# Patient Record
Sex: FEMALE | Race: WHITE | Employment: OTHER | URBAN - METROPOLITAN AREA
[De-identification: names, ages, dates, MRNs, and addresses within clinical notes are randomized per-mention and may not be internally consistent; named-entity substitution may affect disease eponyms.]

---

## 2017-02-03 ENCOUNTER — OFFICE VISIT (OUTPATIENT)
Dept: PAIN MANAGEMENT | Age: 52
End: 2017-02-03

## 2017-02-03 VITALS
DIASTOLIC BLOOD PRESSURE: 99 MMHG | SYSTOLIC BLOOD PRESSURE: 158 MMHG | WEIGHT: 293 LBS | BODY MASS INDEX: 49.49 KG/M2 | HEART RATE: 68 BPM

## 2017-02-03 DIAGNOSIS — M54.50 CHRONIC BILATERAL LOW BACK PAIN WITHOUT SCIATICA: ICD-10-CM

## 2017-02-03 DIAGNOSIS — G89.29 CHRONIC BILATERAL LOW BACK PAIN WITHOUT SCIATICA: ICD-10-CM

## 2017-02-03 DIAGNOSIS — M54.16 LUMBAR NEURITIS: ICD-10-CM

## 2017-02-03 DIAGNOSIS — G43.019 INTRACTABLE MIGRAINE WITHOUT AURA AND WITHOUT STATUS MIGRAINOSUS: Primary | ICD-10-CM

## 2017-02-03 DIAGNOSIS — M79.7 FIBROMYALGIA SYNDROME: ICD-10-CM

## 2017-02-03 DIAGNOSIS — S83.91XA SPRAIN OF RIGHT KNEE, UNSPECIFIED LIGAMENT, INITIAL ENCOUNTER: ICD-10-CM

## 2017-02-03 DIAGNOSIS — M51.36 DDD (DEGENERATIVE DISC DISEASE), LUMBAR: ICD-10-CM

## 2017-02-03 DIAGNOSIS — R10.2 CHRONIC PELVIC PAIN IN FEMALE: ICD-10-CM

## 2017-02-03 DIAGNOSIS — R29.898 MUSCULAR DECONDITIONING: ICD-10-CM

## 2017-02-03 DIAGNOSIS — M25.561 CHRONIC PAIN OF RIGHT KNEE: ICD-10-CM

## 2017-02-03 DIAGNOSIS — R29.6 FALLS FREQUENTLY: ICD-10-CM

## 2017-02-03 DIAGNOSIS — E66.01 MORBID OBESITY DUE TO EXCESS CALORIES (HCC): ICD-10-CM

## 2017-02-03 DIAGNOSIS — G89.29 CHRONIC PAIN OF RIGHT KNEE: ICD-10-CM

## 2017-02-03 DIAGNOSIS — M54.40 LUMBAGO OF LUMBOSACARAL REGION WITH SCIATICA: ICD-10-CM

## 2017-02-03 DIAGNOSIS — G89.29 CHRONIC PELVIC PAIN IN FEMALE: ICD-10-CM

## 2017-02-03 RX ORDER — BUPRENORPHINE HCL 300 MCG
1 FILM, MEDICATED (EA) BUCCAL EVERY 12 HOURS
Qty: 180 FILM | Refills: 0 | Status: SHIPPED | OUTPATIENT
Start: 2017-02-27 | End: 2017-05-01 | Stop reason: SDUPTHER

## 2017-02-03 RX ORDER — HYDROCODONE BITARTRATE AND ACETAMINOPHEN 10; 325 MG/1; MG/1
1 TABLET ORAL
Qty: 60 TAB | Refills: 0 | Status: SHIPPED | OUTPATIENT
Start: 2017-02-03 | End: 2017-02-03 | Stop reason: SDUPTHER

## 2017-02-03 RX ORDER — HYDROCODONE BITARTRATE AND ACETAMINOPHEN 10; 325 MG/1; MG/1
1 TABLET ORAL
Qty: 60 TAB | Refills: 0 | Status: SHIPPED | OUTPATIENT
Start: 2017-03-04 | End: 2017-05-01 | Stop reason: SDUPTHER

## 2017-02-03 NOTE — PROGRESS NOTES
Nursing Notes    Patient presents to the office today in follow-up. Patient rates her pain at 2/10 on the numerical pain scale. Reviewed medications with counts as follows:    Rx Date filled Qty Dispensed Pill Count Last Dose Short     norco 10/325mg  11/22/16 60 3 Today  No    belbuca 300mcg  12/02/16 180 73 Today  No                                     Comments:     POC UDS was not performed in office today    Any new labs or imaging since last appointment? NO    Have you been to an emergency room (ER) or urgent care clinic since your last visit? NO            Have you been hospitalized since your last visit? NO     If yes, where, when, and reason for visit? Have you seen or consulted any other health care providers outside of the [address] [place] [address]  since your last visit? NO     If yes, where, when, and reason for visit? HM deferred to pcp.

## 2017-02-03 NOTE — PATIENT INSTRUCTIONS
Plan:  Continue same medications as prescribed for chronic pain  Contact the office if knee pain/laxity symptoms worsen or do not improve. We will refer for aqua therapy for strengthening and knee pain.  Balance testing may be arranged if falls are still frequent  Follow up in 3 months or sooner if needed  Regular exercise and attention to emotional health and diet remain the most effective ways to treat chronic pain of all kinds  You may contact me with questions or concerns through [address]

## 2017-02-03 NOTE — MR AVS SNAPSHOT
Visit Information Date & Time Provider Department Dept. Phone Encounter #  
 2/3/2017  2:00 PM Lani Curry [address] [facility] [phone] Follow-up Instructions Return in about 3 months (around 5/3/2017). Follow-up and Disposition History Upcoming Health Maintenance Date Due Hepatitis C Screening 1965 DTaP/Tdap/Td series (1 - Tdap) 8/20/1984 PAP AKA CERVICAL CYTOLOGY 8/20/1984 BREAST CANCER SCRN MAMMOGRAM 8/20/2013 FOBT Q 1 YEAR AGE 50-75 8/20/2013 INFLUENZA AGE 9 TO ADULT 8/1/2016 Allergies as of 2/3/2017  Review Complete On: 2/3/2017 By: Georgiana Babinski, LPN Severity Noted Reaction Type Reactions Iodinated Contrast Media - Oral And Iv Dye High 06/25/2013   Systemic Anaphylaxis Iodine    Anaphylaxis Morphine    Nausea and Vomiting Nsaids (Non-steroidal Anti-inflammatory Drug)    Other (comments) Severe edema Current Immunizations  Never Reviewed No immunizations on file. Not reviewed this visit You Were Diagnosed With   
  
 Codes Comments Intractable migraine without aura and without status migrainosus    -  Primary ICD-10-CM: G43.019 
ICD-9-CM: 346.11 Lumbar neuritis     ICD-10-CM: M54.16 
ICD-9-CM: 724.4 Fibromyalgia syndrome     ICD-10-CM: M79.7 ICD-9-CM: 729.1 Lumbago of lumbosacaral region with sciatica     ICD-10-CM: M54.40 ICD-9-CM: 724.2, 724.3 DDD (degenerative disc disease), lumbar     ICD-10-CM: M51.36 
ICD-9-CM: 722.52 Chronic bilateral low back pain without sciatica     ICD-10-CM: M54.5, G89.29 ICD-9-CM: 724.2, 338.29 Morbid obesity due to excess calories (HCC)     ICD-10-CM: E66.01 
ICD-9-CM: 278.01 Chronic pelvic pain in female     ICD-10-CM: R10.2, G89.29 ICD-9-CM: 625.9, 338.29 Muscular deconditioning     ICD-10-CM: R29.898 ICD-9-CM: 781.99 Falls frequently     ICD-10-CM: R29.6 ICD-9-CM: V15.88   
 Chronic pain of right knee     ICD-10-CM: M25.561, G89.29 ICD-9-CM: 719.46, 338.29 Sprain of right knee, unspecified ligament, initial encounter     ICD-10-CM: S83. 91XA ICD-9-CM: 828. 9 Vitals BP Pulse Weight(growth percentile) BMI OB Status Smoking Status (!) 158/99 (BP 1 Location: Left arm, BP Patient Position: Sitting) 68 316 lb (143.3 kg) 49.49 kg/m2 Hysterectomy Never Smoker Vitals History BMI and BSA Data Body Mass Index Body Surface Area  
 49.49 kg/m 2 2.6 m 2 Preferred Pharmacy Pharmacy Name Phone [facility] [facility] [phone] Your Updated Medication List  
  
   
This list is accurate as of: 2/3/17  3:40 PM.  Always use your most recent med list.  
  
  
  
  
 albuterol 90 mcg/actuation inhaler Commonly known as:  PROVENTIL HFA, VENTOLIN HFA, PROAIR HFA Take 1 Puff by inhalation. buprenorphine HCl 300 mcg Film Commonly known as:  BELBUCA  
1 Film by Buccal route every twelve (12) hours for 90 days. Max Daily Amount: 2 Film. for chronic, severe, refractory pain. 90 day bulk for mail order Start taking on:  2/27/2017  
  
 calcium-cholecalciferol (D3) tablet Commonly known as:  CALTRATE 600+D Take 1 Tab by mouth daily. CINNAMON PO Take  by mouth. * CYMBALTA 30 mg capsule Generic drug:  DULoxetine Take 30 mg by mouth three (3) times daily. * CYMBALTA 60 mg capsule Generic drug:  DULoxetine Take 60 mg by mouth daily. diphenhydrAMINE 50 mg tablet Commonly known as:  BENADRYL Take 50 mg by mouth nightly as needed. Flaxseed Oil Oil  
by Does Not Apply route.  
  
 garlic 1 mg Cap Take  by mouth. HYDROcodone-acetaminophen  mg tablet Commonly known as:  Karoline Petri Take 1 Tab by mouth two (2) times daily as needed for Pain. Max Daily Amount: 2 Tabs. Start taking on:  3/4/2017 INDERAL LA 60 mg SR capsule Generic drug:  propranolol LA Take  by mouth daily. levothyroxine 25 mcg tablet Commonly known as:  SYNTHROID Take 0.045 mcg by mouth Daily (before breakfast). lidocaine hcl (pf) 200 mg/mL (20 %) Soln  
by IntraVENous route. LIPITOR PO Take  by mouth.  
  
 metFORMIN 500 mg tablet Commonly known as:  GLUCOPHAGE Take  by mouth two (2) times daily (with meals). omeprazole 20 mg capsule Commonly known as:  PRILOSEC Take 20 mg by mouth daily. promethazine 25 mg tablet Commonly known as:  PHENERGAN Take 25 mg by mouth every six (6) hours as needed. RELPAX 40 mg tablet Generic drug:  eletriptan Take 40 mg by mouth once as needed. may repeat in 2 hours if necessary VITAMIN D2 50,000 unit capsule Generic drug:  ergocalciferol Take 50,000 Units by mouth.  
  
 vitamin E 400 unit capsule Commonly known as:  [address] Take  by mouth daily. * Notice: This list has 2 medication(s) that are the same as other medications prescribed for you. Read the directions carefully, and ask your doctor or other care provider to review them with you. Prescriptions Printed Refills  
 buprenorphine HCl (BELBUCA) 300 mcg film 0 Starting on: 2017 Si Film by Buccal route every twelve (12) hours for 90 days. Max Daily Amount: 2 Film. for chronic, severe, refractory pain. 90 day bulk for mail order Class: Print Route: Buccal  
 HYDROcodone-acetaminophen (NORCO)  mg tablet 0 Starting on: 3/4/2017 Sig: Take 1 Tab by mouth two (2) times daily as needed for Pain. Max Daily Amount: 2 Tabs. Class: Print Route: Oral  
  
We Performed the Following REFERRAL TO PHYSICAL THERAPY [XMJ97 Custom] Comments:  
 Please evaluate patient for strengthening and rehabilitative therapy due to severe deconditioning, frequent falls, and right knee pain.  She will require Aqua Therapy, but may transition to land based therapy if tolerated. Follow-up Instructions Return in about 3 months (around 5/3/2017). Referral Information Referral ID Referred By Referred To  
  
 [id number] Vee Rabia Not Available Visits Status Start Date End Date 18 New Request 2/3/17 2/3/18 If your referral has a status of pending review or denied, additional information will be sent to support the outcome of this decision. Patient Instructions Plan: 
Continue same medications as prescribed for chronic pain 
Contact the office if knee pain/laxity symptoms worsen or do not improve. We will refer for aqua therapy for strengthening and knee pain. Balance testing may be arranged if falls are still frequent Follow up in 3 months or sooner if needed Regular exercise and attention to emotional health and diet remain the most effective ways to treat chronic pain of all kinds You may contact me with questions or concerns through [address] Patient Instructions History Introducing [facility] & HEALTH SERVICES! [employer] introduces Cels[employer] patient portal. Now you can access parts of your medical record, email your doctor's office, and request medication refills online. 1. In your internet browser, go to [email or web address] 2. Click on the First Time User? Click Here link in the Sign In box. You will see the New Member Sign Up page. 3. Enter your Celsense Access Code exactly as it appears below. You will not need to use this code after youve completed the sign-up process. If you do not sign up before the expiration date, you must request a new code. · Celsense Access Code: [email or web address] Expires: 5/4/2017  3:39 PM 
 
4. Enter the last four digits of your Social Security Number (xxxx) and Date of Birth (mm/dd/yyyy) as indicated and click Submit. You will be taken to the next sign-up page. 5. Create a Celsense ID.  This will be your Celsense login ID and cannot be changed, so think of one that is secure and easy to remember. 6. Create a [email or web address] password. You can change your password at any time. 7. Enter your Password Reset Question and Answer. This can be used at a later time if you forget your password. 8. Enter your e-mail address. You will receive e-mail notification when new information is available in [address] 9. Click Sign Up. You can now view and download portions of your medical record. 10. Click the Download Summary menu link to download a portable copy of your medical information. If you have questions, please visit the Frequently Asked Questions section of the [email or web address] website. Remember, [email or web address] is NOT to be used for urgent needs. For medical emergencies, dial 911. Now available from your iPhone and Android! Please provide this summary of care documentation to your next provider. Your primary care clinician is listed as Dorian Huntley. If you have any questions after today's visit, please call [phone].

## 2017-02-03 NOTE — PROGRESS NOTES
HISTORY OF PRESENT ILLNESS  Krystle Reynolds is a 46 y.o. female. Patient presents for follow up of chronic widespread pain due to fibromyalgia and lumbar degenerative disc disease. She also suffers with bilateral knee pain due to osteoarthritis as well as periodic migraines. She reports that she twisted her right knee when she tripped up the stairs ten days ago. She did not seek medical attention, but is wearing a brace for support, which has been helpful. She denies overt joint laxity or further falls, but does report that it often feels as it will buckle (but doesn't). She does not feel that is it is seriously injured it and feels that it is improving gradually. She will contact the office if symptoms worsen or do not improve. She reports that migraines have been more frequent of late. She currently takes propranolol daily for migraine prevention, which has not been as effective of late. Her daughter admits that the patient spends most days of the week, most of the day, in bed. she is reluctant to discuss this, but admits that she spends the vast majority of her day sedentary. her daughter then states that she is falling at least once every two weeks. We discussed that this will inevitably cause bad outcomes for her health, well-being, and pain. Deconditioning will significantly increase the risk of falls. We will arrange aquatic therapy to slowly increase her strength. We may also send her to [facility] for balance testing, if falls do not improve. Medications continue to work well for pain control overall. Krystle Reynolds is tolerating medications well, with no untoward side effects noted. She is able to stay more active with less discomfort with these current doses. The patient reports an average of 70 % relief with this regimen. Most recent UDS and  were consistent with prescribed medications. Pill counts are appropriate.  She is informed of side effects, risks, and benefits of this regimen, and emphasizes that she derives a significant improvement in functionality and quality of life, and notes that non-opioid medications and therapies in the past have not offered significant benefit. She denies new or worsening insomnia or constipation issues. She denies any falls, injuries, or hospitalizations since the last visit. A total of 40 minutes was spent with the patient of which more than 50% of the time was spent counseling the patient. HPI--see above    ROS    Physical Exam    ASSESSMENT and PLAN    ICD-10-CM ICD-9-CM    1. Intractable migraine without aura and without status migrainosus G43.019 346.11    2. Lumbar neuritis M54.16 724.4    3. Fibromyalgia syndrome M79.7 729.1    4. Lumbago of lumbosacaral region with sciatica M54.40 724.2      724.3    5. DDD (degenerative disc disease), lumbar M51.36 722.52    6. Chronic bilateral low back pain without sciatica M54.5 724.2     G89.29 338.29    7. Morbid obesity due to excess calories (HCC) E66.01 278.01    8. Chronic pelvic pain in female R10.2 625.9     G89.29 338.29       Plan:  Continue same medications as prescribed for chronic pain  Contact the office if knee pain/laxity symptoms worsen or do not improve. We will refer for aqua therapy for strengthening and knee pain.  Balance testing may be arranged if falls are still frequent  Follow up in 3 months or sooner if needed  Regular exercise and attention to emotional health and diet remain the most effective ways to treat chronic pain of all kinds  You may contact me with questions or concerns through [address]

## 2017-02-09 ENCOUNTER — DOCUMENTATION ONLY (OUTPATIENT)
Dept: PAIN MANAGEMENT | Age: 52
End: 2017-02-09

## 2017-03-22 ENCOUNTER — TELEPHONE (OUTPATIENT)
Dept: PAIN MANAGEMENT | Age: 52
End: 2017-03-22

## 2017-03-22 NOTE — TELEPHONE ENCOUNTER
Called [facility] re pa for [facility] - spoke with Sola Singh - said pt gets med through mail order and ES filled script on 3/10/17.

## 2017-05-01 ENCOUNTER — OFFICE VISIT (OUTPATIENT)
Dept: PAIN MANAGEMENT | Age: 52
End: 2017-05-01

## 2017-05-01 VITALS
HEART RATE: 73 BPM | DIASTOLIC BLOOD PRESSURE: 87 MMHG | HEIGHT: 67 IN | SYSTOLIC BLOOD PRESSURE: 134 MMHG | WEIGHT: 293 LBS | BODY MASS INDEX: 45.99 KG/M2

## 2017-05-01 DIAGNOSIS — G89.29 CHRONIC PAIN OF LEFT KNEE: ICD-10-CM

## 2017-05-01 DIAGNOSIS — G43.009 MIGRAINE WITHOUT AURA AND WITHOUT STATUS MIGRAINOSUS, NOT INTRACTABLE: ICD-10-CM

## 2017-05-01 DIAGNOSIS — Z79.899 ENCOUNTER FOR LONG-TERM (CURRENT) USE OF HIGH-RISK MEDICATION: ICD-10-CM

## 2017-05-01 DIAGNOSIS — M54.16 LUMBAR NEURITIS: Primary | ICD-10-CM

## 2017-05-01 DIAGNOSIS — E66.01 SEVERE OBESITY (BMI >= 40) (HCC): ICD-10-CM

## 2017-05-01 DIAGNOSIS — G24.3 ISOLATED CERVICAL DYSTONIA: ICD-10-CM

## 2017-05-01 DIAGNOSIS — M79.7 FIBROMYALGIA SYNDROME: ICD-10-CM

## 2017-05-01 DIAGNOSIS — M53.3 COCCYDYNIA: ICD-10-CM

## 2017-05-01 DIAGNOSIS — M70.50 PES ANSERINE BURSITIS: ICD-10-CM

## 2017-05-01 DIAGNOSIS — M51.36 LUMBAR DEGENERATIVE DISC DISEASE: ICD-10-CM

## 2017-05-01 DIAGNOSIS — M25.562 CHRONIC PAIN OF LEFT KNEE: ICD-10-CM

## 2017-05-01 LAB
ALCOHOL UR POC: NORMAL
AMPHETAMINES UR POC: NORMAL
BARBITURATES UR POC: NORMAL
BENZODIAZEPINES UR POC: NORMAL
BUPRENORPHINE UR POC: NORMAL
CANNABINOIDS UR POC: NORMAL
CARISOPRODOL UR POC: NORMAL
COCAINE UR POC: NORMAL
FENTANYL UR POC: NORMAL
MDMA/ECSTASY UR POC: NORMAL
METHADONE UR POC: NORMAL
METHAMPHETAMINE UR POC: NORMAL
METHYLPHENIDATE UR POC: NORMAL
OPIATES UR POC: NORMAL
OXYCODONE UR POC: NORMAL
PHENCYCLIDINE UR POC: NORMAL
PROPOXYPHENE UR POC: NORMAL
TRAMADOL UR POC: NORMAL
TRICYCLICS UR POC: NORMAL

## 2017-05-01 RX ORDER — HYDROCODONE BITARTRATE AND ACETAMINOPHEN 10; 325 MG/1; MG/1
1 TABLET ORAL
Qty: 60 TAB | Refills: 0 | Status: SHIPPED | OUTPATIENT
Start: 2017-05-01 | End: 2018-08-24

## 2017-05-01 RX ORDER — BUPRENORPHINE HCL 300 MCG
1 FILM, MEDICATED (EA) BUCCAL EVERY 12 HOURS
Qty: 180 FILM | Refills: 0 | Status: SHIPPED | OUTPATIENT
Start: 2017-06-01 | End: 2017-12-12 | Stop reason: SDUPTHER

## 2017-05-01 NOTE — PROGRESS NOTES
Post Injection Instructions    If you develop any abnormal symptoms, such as itching, swelling, redness, rash, or shortness of breath, please call our office. Normally, these are temporary symptoms, which resolve within several hours to a day, but our office is more than happy to answer any questions you may have. The provider would like you to observe the injection area for redness, swelling, or increased heat. If any or all of these reactions occur, please call our office as soon as possible. This reaction may indicate the first signs of infection. Although very rare, it is  best if caught early. I have reviewed these instructions and the patient verbalizes understanding.

## 2017-05-01 NOTE — PROGRESS NOTES
Nursing Notes    Patient presents to the office today in follow-up. Patient rates her pain at 4/10 on the numerical pain scale. Reviewed medications with counts as follows:    Rx Date filled Qty Dispensed Pill Count Last Dose Short   belbucca 300 mcg/hr 03/17/17 180 114 today no   norco 10/325 mg 03/08/17 60 19 yesterday no                       POC UDS was performed in office today. Any new labs or imaging since last appointment? NO    Have you been to an emergency room (ER) or urgent care clinic since your last visit? NO            Have you been hospitalized since your last visit? NO     If yes, where, when, and reason for visit? Have you seen or consulted any other health care providers outside of the [address] [place] [address]  since your last visit? NO     If yes, where, when, and reason for visit? HM deferred to pcp.

## 2017-05-01 NOTE — PATIENT INSTRUCTIONS
Plan:  Continue same medications as prescribed for chronic pain  Pes anserinus injection today for knee pain  Referral to Dr. Korina Landon for caudal GEOVANNI  Follow up in 3 months or sooner if needed  Regular exercise and attention to emotional health and diet remain the most effective ways to treat chronic pain of all kinds  You may contact me with questions or concerns through [address]

## 2017-05-01 NOTE — MR AVS SNAPSHOT
Visit Information Date & Time Provider Department Dept. Phone Encounter #  
 5/1/2017  2:00 PM [facility] [phone] Follow-up Instructions Return in about 16 weeks (around 8/21/2017). Upcoming Health Maintenance Date Due Hepatitis C Screening 1965 DTaP/Tdap/Td series (1 - Tdap) 8/20/1984 PAP AKA CERVICAL CYTOLOGY 8/20/1984 BREAST CANCER SCRN MAMMOGRAM 8/20/2013 FOBT Q 1 YEAR AGE 50-75 8/20/2013 INFLUENZA AGE 9 TO ADULT 8/1/2017 Allergies as of 5/1/2017  Review Complete On: 5/1/2017 By: Geovanna Jackman LPN Severity Noted Reaction Type Reactions Iodinated Contrast Media - Oral And Iv Dye High 06/25/2013   Systemic Anaphylaxis Iodine    Anaphylaxis Morphine    Nausea and Vomiting Nsaids (Non-steroidal Anti-inflammatory Drug)    Other (comments) Severe edema Current Immunizations  Never Reviewed No immunizations on file. Not reviewed this visit You Were Diagnosed With   
  
 Codes Comments Lumbar neuritis    -  Primary ICD-10-CM: M54.16 
ICD-9-CM: 724.4 Encounter for long-term (current) use of high-risk medication     ICD-10-CM: Z79.899 ICD-9-CM: V58.69 Migraine without aura and without status migrainosus, not intractable     ICD-10-CM: X84.054 ICD-9-CM: 346.10 Lumbar degenerative disc disease     ICD-10-CM: M51.36 
ICD-9-CM: 722.52 Fibromyalgia syndrome     ICD-10-CM: M79.7 ICD-9-CM: 729.1 Coccydynia     ICD-10-CM: M53.3 ICD-9-CM: 724.79 Severe obesity (BMI >= 40) (HCC)     ICD-10-CM: E66.01 
ICD-9-CM: 278.01 Chronic pain of left knee     ICD-10-CM: M25.562, G89.29 ICD-9-CM: 719.46, 338.29 Pes anserine bursitis     ICD-10-CM: M70.50 ICD-9-CM: 726.61 Isolated cervical dystonia     ICD-10-CM: G24.3 ICD-9-CM: 333.83 Vitals BP Pulse Height(growth percentile) Weight(growth percentile) BMI OB Status 134/87 73 5' 7\" (1.702 m) 303 lb (137.4 kg) 47.46 kg/m2 Hysterectomy Smoking Status Never Smoker Vitals History BMI and BSA Data Body Mass Index Body Surface Area  
 47.46 kg/m 2 2.55 m 2 Preferred Pharmacy Pharmacy Name Phone [facility] [phone] [facility] [phone] Your Updated Medication List  
  
   
This list is accurate as of: 5/1/17  3:12 PM.  Always use your most recent med list.  
  
  
  
  
 albuterol 90 mcg/actuation inhaler Commonly known as:  PROVENTIL HFA, VENTOLIN HFA, PROAIR HFA Take 1 Puff by inhalation. buprenorphine HCl 300 mcg Film Commonly known as:  BELBUCA  
1 Film by Buccal route every twelve (12) hours for 90 days. Max Daily Amount: 2 Film. for chronic, severe, refractory pain. 90 day bulk for mail order Start taking on:  6/1/2017  
  
 calcium-cholecalciferol (D3) tablet Commonly known as:  CALTRATE 600+D Take 1 Tab by mouth daily. CINNAMON PO Take  by mouth. * CYMBALTA 30 mg capsule Generic drug:  DULoxetine Take 30 mg by mouth three (3) times daily. * CYMBALTA 60 mg capsule Generic drug:  DULoxetine Take 60 mg by mouth daily. diphenhydrAMINE 50 mg tablet Commonly known as:  BENADRYL Take 50 mg by mouth nightly as needed. Flaxseed Oil Oil  
by Does Not Apply route.  
  
 garlic 1 mg Cap Take  by mouth. HYDROcodone-acetaminophen  mg tablet Commonly known as:  [place] Take 1 Tab by mouth two (2) times daily as needed for Pain. Max Daily Amount: 2 Tabs. INDERAL LA 60 mg SR capsule Generic drug:  propranolol LA Take  by mouth daily. levothyroxine 25 mcg tablet Commonly known as:  SYNTHROID Take 0.045 mcg by mouth Daily (before breakfast). lidocaine hcl (pf) 200 mg/mL (20 %) Soln  
by IntraVENous route. LIPITOR PO Take  by mouth.  
  
 metFORMIN 500 mg tablet Commonly known as:  GLUCOPHAGE Take  by mouth two (2) times daily (with meals). omeprazole 20 mg capsule Commonly known as:  PRILOSEC Take 20 mg by mouth daily. promethazine 25 mg tablet Commonly known as:  PHENERGAN Take 25 mg by mouth every six (6) hours as needed. RELPAX 40 mg tablet Generic drug:  eletriptan Take 40 mg by mouth once as needed. may repeat in 2 hours if necessary  
  
 triamcinolone acetonide 10 mg/mL injection Commonly known as:  KENALOG 2 mL by IntraMUSCular route once for 1 dose. VITAMIN D2 50,000 unit capsule Generic drug:  ergocalciferol Take 50,000 Units by mouth.  
  
 vitamin E 400 unit capsule Commonly known as:  [address] Take  by mouth daily. * Notice: This list has 2 medication(s) that are the same as other medications prescribed for you. Read the directions carefully, and ask your doctor or other care provider to review them with you. Prescriptions Printed Refills  
 buprenorphine HCl (BELBUCA) 300 mcg film 0 Starting on: 2017 Si Film by Buccal route every twelve (12) hours for 90 days. Max Daily Amount: 2 Film. for chronic, severe, refractory pain. 90 day bulk for mail order Class: Print Route: Buccal  
 HYDROcodone-acetaminophen (NORCO)  mg tablet 0 Sig: Take 1 Tab by mouth two (2) times daily as needed for Pain. Max Daily Amount: 2 Tabs. Class: Print Route: Oral  
  
We Performed the Following AMB POC DRUG SCREEN () [ [facility]] DRUG SCREEN [[id number] Custom] MS DRAIN/INJECT LARGE JOINT/BURSA [id number] CPT(R)] REFERRAL TO PAIN MANAGEMENT [[id number] Custom] Comments:  
 Please evaluate patient for chronic low back and coccygeal pain due to lumbar neuritis/DDD. She reports that caudal GEOVANNI in the past has provided more than 6 months of excellent relief. Thank you in advance for again seeing this pleasant patient. Follow-up Instructions Return in about 16 weeks (around 8/21/2017). Referral Information Referral ID Referred By Referred To  
  
 [id number] Brigido Lazar Not Available Visits Status Start Date End Date 1 New Request 5/1/17 5/1/18 If your referral has a status of pending review or denied, additional information will be sent to support the outcome of this decision. Patient Instructions Plan: 
Continue same medications as prescribed for chronic pain Pes anserinus injection today for knee pain Referral to Dr. Michelle Garcia for caudal GEOVANNI Follow up in 3 months or sooner if needed Regular exercise and attention to emotional health and diet remain the most effective ways to treat chronic pain of all kinds You may contact me with questions or concerns through [address] Introducing [facility] & HEALTH SERVICES! [employer] introduces [employer]abbit patient portal. Now you can access parts of your medical record, email your doctor's office, and request medication refills online. 1. In your internet browser, go to [email or web address] 2. Click on the First Time User? Click Here link in the Sign In box. You will see the New Member Sign Up page. 3. Enter your Grabbit Access Code exactly as it appears below. You will not need to use this code after youve completed the sign-up process. If you do not sign up before the expiration date, you must request a new code. · Grabbit Access Code: [email or web address] Expires: 5/4/2017  4:39 PM 
 
4. Enter the last four digits of your Social Security Number (xxxx) and Date of Birth (mm/dd/yyyy) as indicated and click Submit. You will be taken to the next sign-up page. 5. Create a Grabbit ID. This will be your Grabbit login ID and cannot be changed, so think of one that is secure and easy to remember. 6. Create a Grabbit password. You can change your password at any time. 7. Enter your Password Reset Question and Answer.  This can be used at a later time if you forget your password. 8. Enter your e-mail address. You will receive e-mail notification when new information is available in [address] 9. Click Sign Up. You can now view and download portions of your medical record. 10. Click the Download Summary menu link to download a portable copy of your medical information. If you have questions, please visit the Frequently Asked Questions section of the [employer] website. Remember, [employer] is NOT to be used for urgent needs. For medical emergencies, dial 911. Now available from your iPhone and Android! Please provide this summary of care documentation to your next provider. Your primary care clinician is listed as Chloe Pan. If you have any questions after today's visit, please call [phone].

## 2017-05-01 NOTE — PROGRESS NOTES
HISTORY OF PRESENT ILLNESS  Antonina Zamudio is a 46 y.o. female. Patient presents for follow up of chronic widespread pain due to fibromyalgia and lumbar degenerative disc disease. She also suffers with bilateral knee pain due to osteoarthritis as well as periodic migraines. She reports that with the combination of aquatic therapy and balance training since her last visit, she reports that her core strength and balance have improved significantly. She is now attending PT 1-2 hours, three days per week. She does report one or two fibro flares since starting, to which she attributed to being aggressively treated by an overzealous therapist. Overall, she feels that she is slowly improving. However, she complains of recurrence of left knee pain attributable to pes anserine bursitis. She reports that bursal injections performed in May 2016 was extremely helpful and provided more than four months' pain relief. She requests a repeat injection today. Procedure was discussed and consent forms signed--see treatment plan below. She also notes that chronic low back and coccydynia pain is beginning to recur and she requests a new referral to Dr. Sajan Hood for repeat caudal GEOVANNI, which historically provides pain relief for 6+ months. Pt reports average of 4-5/10 pain scale most days. Belbuca 300 mcg BID continues to work very well for pain control overall. Antonina Zamudio is tolerating medications well, with no untoward side effects noted. She is able to stay more active with less discomfort with these current doses. The patient reports an average of 70% relief with this regimen. Most recent UDS and  were consistent with prescribed medications. Pill counts are appropriate.  She is informed of side effects, risks, and benefits of this regimen, and emphasizes that she derives a significant improvement in functionality and quality of life, and notes that non-opioid medications and therapies in the past have not offered significant benefit. She denies new or worsening insomnia or constipation issues. She denies any falls, injuries, or hospitalizations since the last visit. A total of 50 minutes was spent with the patient of which more than 50% of the time was spent counseling the patient. [id number] [facility]  OFFICE PROCEDURE PROGRESS NOTE        Chart reviewed for the following:   Emma GUZMAN PA-C, have reviewed the History, Physical and updated the Allergic reactions for [address] performed immediately prior to start of procedure:   MEGAN [address] PA-C, have performed the following reviews on Greg Cheatham prior to the start of the procedure:            * Patient was identified by name and date of birth   * Agreement on procedure being performed was verified  * Risks and Benefits explained to the patient  * Procedure site verified and marked as necessary  * Patient was positioned for comfort  * Consent was signed and verified     Time: 1500      Date of procedure: 5/1/2017    Procedure performed by:  Alivia S Kylie Macias PA-C    Provider assisted by: Jostin Garcia LPN    Patient assisted by: self    How tolerated by patient: tolerated the procedure well with no complications    Post Procedural Pain Scale: 4 - Hurts Little More    Comments: none  Procedure Notes for left knee injection (pes anserine bursa): After consent was obtained, using sterile technique the left knee joint was prepped using Chlorprep. Local anesthetic used: ethyl chloride spray. Kenalog 20 mg was mixed with 0.5% marcaine 1 ml and injected into the joint and the needle withdrawn. The procedure was then repeated on the left knee. The procedure was well tolerated. The patient is asked to continue to rest the joint for a few more days before resuming regular activities. It may be more painful for the first 1-2 days. Watch for fever, or increased swelling or persistent pain in the joint.  Call or return to clinic prn if such symptoms occur or there is failure to improve as anticipated. Pain level after procedure: 2/10  HPI--see above    ROS  Constitutional: Positive for malaise/fatigue. Negative for fever, chills and weight loss. HENT: Positive for neck pain. Negative for congestion and sore throat. Eyes: Negative for blurred vision and double vision. Respiratory: Negative for cough and shortness of breath. Cardiovascular: Positive for leg swelling. Negative for chest pain. Gastrointestinal: Positive for heartburn. Negative for nausea, vomiting, diarrhea and constipation. Genitourinary: Negative. Musculoskeletal: Positive for myalgias, back pain and joint pain. Negative for falls. Skin: Positive for itching and rash (\"I always break out\" (has been evaluated)). Neurological: Positive for tingling, sensory change, weakness and headaches. Negative for dizziness, tremors and seizures. Endo/Heme/Allergies: Positive for environmental allergies. Bruises/bleeds easily. Psychiatric/Behavioral: Positive for depression. Negative for suicidal ideas. The patient is not nervous/anxious and does not have insomnia. Physical Exam  Constitutional: She is oriented to person, place, and time. She appears well-developed and well-nourished. No distress. very pleasant, articulate  HENT:   Head: Normocephalic. Eyes: Conjunctivae and EOM are normal. Pupils are equal, round, and reactive to light.   glasses   Neck: Normal range of motion. Painful ROM   Pulmonary/Chest: Effort normal. No respiratory distress. Musculoskeletal: She exhibits tenderness (neck/lumbar). She exhibits no edema. Right knee: She exhibits decreased range of motion. Tenderness found. Left knee: She exhibits decreased range of motion. Tenderness found at pes anserinus        Cervical back: She exhibits decreased range of motion, tenderness, pain and spasm.         Lumbar back: She exhibits decreased range of motion, tenderness, pain and spasm. Neurological: She is alert and oriented to person, place, and time. No cranial nerve deficit (grossly). Gait (antalgic) abnormal.   ASSESSMENT and PLAN    ICD-10-CM ICD-9-CM    1. Lumbar neuritis M54.16 724.4 REFERRAL TO PAIN MANAGEMENT   2. Encounter for long-term (current) use of high-risk medication Z79.899 V58.69 DRUG SCREEN      AMB POC DRUG SCREEN ()   3. Migraine without aura and without status migrainosus, not intractable G43.009 346.10    4. Lumbar degenerative disc disease M51.36 722.52 REFERRAL TO PAIN MANAGEMENT   5. Fibromyalgia syndrome M79.7 729.1    6. Coccydynia M53.3 724.79 REFERRAL TO PAIN MANAGEMENT   7. Severe obesity (BMI >= 40) ([facility]) E66.01 278.01    8. Chronic pain of left knee M25.562 719.46     G89.29 338.29    9. Pes anserine bursitis M70.50 726.61 triamcinolone acetonide (KENALOG) 10 mg/mL injection      VT DRAIN/INJECT LARGE JOINT/BURSA   10.  Isolated cervical dystonia G24.3 333.83       Plan:  Continue same medications as prescribed for chronic pain  Pes anserinus injection today for knee pain  Referral to Dr. Lenka Tejada for caudal GEOVANNI  Follow up in 3 months or sooner if needed  Regular exercise and attention to emotional health and diet remain the most effective ways to treat chronic pain of all kinds  You may contact me with questions or concerns through [address]

## 2017-05-24 ENCOUNTER — DOCUMENTATION ONLY (OUTPATIENT)
Dept: PAIN MANAGEMENT | Age: 52
End: 2017-05-24

## 2017-05-24 NOTE — PROGRESS NOTES
The pt's referral for Dr. Lyndsey Talley was faxed over and a fax confirmation was received. They will contact the pt to schedule an appt. It was faxed on 05/22/17.

## 2017-08-24 ENCOUNTER — OFFICE VISIT (OUTPATIENT)
Dept: PAIN MANAGEMENT | Age: 52
End: 2017-08-24

## 2017-08-24 VITALS
TEMPERATURE: 98.1 F | HEART RATE: 73 BPM | HEIGHT: 67 IN | RESPIRATION RATE: 20 BRPM | DIASTOLIC BLOOD PRESSURE: 78 MMHG | WEIGHT: 293 LBS | BODY MASS INDEX: 45.99 KG/M2 | SYSTOLIC BLOOD PRESSURE: 141 MMHG

## 2017-08-24 DIAGNOSIS — Z79.899 ENCOUNTER FOR LONG-TERM (CURRENT) USE OF HIGH-RISK MEDICATION: ICD-10-CM

## 2017-08-24 DIAGNOSIS — M70.52 PES ANSERINUS BURSITIS OF LEFT KNEE: ICD-10-CM

## 2017-08-24 DIAGNOSIS — E66.01 SEVERE OBESITY (BMI >= 40) (HCC): ICD-10-CM

## 2017-08-24 DIAGNOSIS — M51.36 BULGING LUMBAR DISC: ICD-10-CM

## 2017-08-24 DIAGNOSIS — M53.3 COCCYDYNIA: ICD-10-CM

## 2017-08-24 DIAGNOSIS — M54.40 LUMBAGO OF LUMBOSACARAL REGION WITH SCIATICA: ICD-10-CM

## 2017-08-24 DIAGNOSIS — M70.50 PES ANSERINE BURSITIS: ICD-10-CM

## 2017-08-24 DIAGNOSIS — R10.2 CHRONIC PELVIC PAIN IN FEMALE: ICD-10-CM

## 2017-08-24 DIAGNOSIS — M51.36 DDD (DEGENERATIVE DISC DISEASE), LUMBAR: ICD-10-CM

## 2017-08-24 DIAGNOSIS — G89.29 CHRONIC PELVIC PAIN IN FEMALE: ICD-10-CM

## 2017-08-24 DIAGNOSIS — M54.41 CHRONIC RIGHT-SIDED LOW BACK PAIN WITH RIGHT-SIDED SCIATICA: ICD-10-CM

## 2017-08-24 DIAGNOSIS — M79.7 FIBROMYALGIA SYNDROME: ICD-10-CM

## 2017-08-24 DIAGNOSIS — G89.29 CHRONIC RIGHT-SIDED LOW BACK PAIN WITH RIGHT-SIDED SCIATICA: ICD-10-CM

## 2017-08-24 DIAGNOSIS — M51.36 LUMBAR DEGENERATIVE DISC DISEASE: Primary | ICD-10-CM

## 2017-08-24 RX ORDER — BUPRENORPHINE HCL 300 MCG
1 FILM, MEDICATED (EA) BUCCAL EVERY 12 HOURS
Qty: 60 FILM | Refills: 2 | Status: SHIPPED | OUTPATIENT
Start: 2017-09-07 | End: 2018-08-24 | Stop reason: SDUPTHER

## 2017-08-24 RX ORDER — HYDROCODONE BITARTRATE AND ACETAMINOPHEN 10; 325 MG/1; MG/1
1 TABLET ORAL
Qty: 60 TAB | Refills: 0 | Status: SHIPPED | OUTPATIENT
Start: 2017-10-22 | End: 2018-03-14 | Stop reason: SDUPTHER

## 2017-08-24 RX ORDER — HYDROCODONE BITARTRATE AND ACETAMINOPHEN 10; 325 MG/1; MG/1
1 TABLET ORAL
Qty: 60 TAB | Refills: 0 | Status: SHIPPED | OUTPATIENT
Start: 2017-09-23 | End: 2018-03-14 | Stop reason: SDUPTHER

## 2017-08-24 RX ORDER — HYDROCODONE BITARTRATE AND ACETAMINOPHEN 10; 325 MG/1; MG/1
1 TABLET ORAL
Qty: 60 TAB | Refills: 0 | Status: SHIPPED | OUTPATIENT
Start: 2017-08-24 | End: 2018-03-14 | Stop reason: SDUPTHER

## 2017-08-24 NOTE — PROGRESS NOTES
Nursing Notes    Patient presents to the office today in follow-up. Patient rates her pain at 4/10 on the numerical pain scale. Reviewed medications with counts as follows:    Rx Date filled Qty Dispensed Pill Count Last Dose Short   belbuca 300mcg 06/08/17 60 9 +1 box today no   Hydrocodone 10-325mg 05/23/17 60 3 08/22/17 no                                Comments:     POC UDS was not performed in office today    Any new labs or imaging since last appointment? YES, lab PCP    Have you been to an emergency room (ER) or urgent care clinic since your last visit? NO            Have you been hospitalized since your last visit? NO     If yes, where, when, and reason for visit? Have you seen or consulted any other health care providers outside of the [address] [place] [address]  since your last visit? YES     If yes, where, when, and reason for visit? HM deferred to pcp.

## 2017-08-24 NOTE — MR AVS SNAPSHOT
Visit Information Date & Time Provider Department Dept. Phone Encounter #  
 8/24/2017  2:20 PM Ellis Melgoza [facility] [phone] Follow-up Instructions Return in about 3 months (around 11/24/2017). Upcoming Health Maintenance Date Due Hepatitis C Screening 1965 DTaP/Tdap/Td series (1 - Tdap) 8/20/1984 PAP AKA CERVICAL CYTOLOGY 8/20/1984 BREAST CANCER SCRN MAMMOGRAM 8/20/2013 FOBT Q 1 YEAR AGE 50-75 8/20/2013 INFLUENZA AGE 9 TO ADULT 8/1/2017 Allergies as of 8/24/2017  Review Complete On: 8/24/2017 By: Abraham Adams PA-C Severity Noted Reaction Type Reactions Iodinated Contrast- Oral And Iv Dye High 06/25/2013   Systemic Anaphylaxis Iodine    Anaphylaxis Morphine    Nausea and Vomiting Nsaids (Non-steroidal Anti-inflammatory Drug)    Other (comments) Severe edema Current Immunizations  Never Reviewed No immunizations on file. Not reviewed this visit You Were Diagnosed With   
  
 Codes Comments Pes anserinus bursitis of left knee     ICD-10-CM: M70.52 ICD-9-CM: 726.61 Chronic right-sided low back pain with right-sided sciatica     ICD-10-CM: M54.41, G89.29 ICD-9-CM: 724.2, 724.3, 338.29 Vitals BP Pulse Temp Resp Height(growth percentile) Weight(growth percentile) 141/78 73 98.1 °F (36.7 °C) 20 5' 7\" (1.702 m) 315 lb (142.9 kg) BMI OB Status Smoking Status 49.34 kg/m2 Hysterectomy Never Smoker BMI and BSA Data Body Mass Index Body Surface Area  
 49.34 kg/m 2 2.6 m 2 Preferred Pharmacy Pharmacy Name Phone [facility] [address] [facility] [phone] Your Updated Medication List  
  
   
This list is accurate as of: 8/24/17  3:54 PM.  Always use your most recent med list.  
  
  
  
  
 albuterol 90 mcg/actuation inhaler Commonly known as:  PROVENTIL HFA, VENTOLIN HFA, PROAIR HFA Take 1 Puff by inhalation. * buprenorphine HCl 300 mcg Film Commonly known as:  BELBUCA  
1 Film by Buccal route every twelve (12) hours for 90 days. Max Daily Amount: 2 Film. for chronic, severe, refractory pain. 90 day bulk for mail order * buprenorphine HCl 300 mcg Film Commonly known as:  BELBUCA  
1 Film by Buccal route every twelve (12) hours. Max Daily Amount: 2 Film. for chronic, severe, refractory pain Start taking on:  9/7/2017  
  
 calcium-cholecalciferol (D3) tablet Commonly known as:  CALTRATE 600+D Take 1 Tab by mouth daily. CINNAMON PO Take  by mouth. * CYMBALTA 30 mg capsule Generic drug:  DULoxetine Take 30 mg by mouth three (3) times daily. * CYMBALTA 60 mg capsule Generic drug:  DULoxetine Take 60 mg by mouth daily. diphenhydrAMINE 50 mg tablet Commonly known as:  BENADRYL Take 50 mg by mouth nightly as needed. Flaxseed Oil Oil  
by Does Not Apply route.  
  
 garlic 1 mg Cap Take  by mouth.  
  
 * HYDROcodone-acetaminophen  mg tablet Commonly known as:  Radha Leyland Take 1 Tab by mouth two (2) times daily as needed for Pain. Max Daily Amount: 2 Tabs. * HYDROcodone-acetaminophen  mg tablet Commonly known as:  Radha Leyland Take 1 Tab by mouth two (2) times daily as needed for Pain. Max Daily Amount: 2 Tabs. Note dose change * HYDROcodone-acetaminophen  mg tablet Commonly known as:  Radha Leyland Take 1 Tab by mouth two (2) times daily as needed for Pain. Max Daily Amount: 2 Tabs. Start taking on:  9/23/2017  
  
 * HYDROcodone-acetaminophen  mg tablet Commonly known as:  Radha Leyland Take 1 Tab by mouth two (2) times daily as needed for Pain. Max Daily Amount: 2 Tabs. Note dose change Start taking on:  10/22/2017 INDERAL LA 60 mg SR capsule Generic drug:  propranolol LA Take  by mouth daily. levothyroxine 25 mcg tablet Commonly known as:  SYNTHROID Take 0.045 mcg by mouth Daily (before breakfast). lidocaine hcl (pf) 200 mg/mL (20 %) Soln  
by IntraVENous route. LIPITOR PO Take  by mouth.  
  
 metFORMIN 500 mg tablet Commonly known as:  GLUCOPHAGE Take  by mouth two (2) times daily (with meals). omeprazole 20 mg capsule Commonly known as:  PRILOSEC Take 20 mg by mouth daily. promethazine 25 mg tablet Commonly known as:  PHENERGAN Take 25 mg by mouth every six (6) hours as needed. RELPAX 40 mg tablet Generic drug:  eletriptan Take 40 mg by mouth once as needed. may repeat in 2 hours if necessary VITAMIN D2 50,000 unit capsule Generic drug:  ergocalciferol Take 50,000 Units by mouth.  
  
 vitamin E 400 unit capsule Commonly known as:  [address] Take  by mouth daily. * Notice: This list has 8 medication(s) that are the same as other medications prescribed for you. Read the directions carefully, and ask your doctor or other care provider to review them with you. Prescriptions Printed Refills HYDROcodone-acetaminophen (NORCO)  mg tablet 0 Starting on: 2017 Sig: Take 1 Tab by mouth two (2) times daily as needed for Pain. Max Daily Amount: 2 Tabs. Class: Print Route: Oral  
 HYDROcodone-acetaminophen (NORCO)  mg tablet 0 Sig: Take 1 Tab by mouth two (2) times daily as needed for Pain. Max Daily Amount: 2 Tabs. Note dose change Class: Print Route: Oral  
 HYDROcodone-acetaminophen (NORCO)  mg tablet 0 Starting on: 10/22/2017 Sig: Take 1 Tab by mouth two (2) times daily as needed for Pain. Max Daily Amount: 2 Tabs. Note dose change Class: Print Route: Oral  
 buprenorphine HCl (BELBUCA) 300 mcg film 2 Starting on: 2017 Si Film by Buccal route every twelve (12) hours. Max Daily Amount: 2 Film. for chronic, severe, refractory pain  
 Class: Print  Route: Buccal  
  
 We Performed the Following REFERRAL TO PAIN MANAGEMENT [[id number] Custom] Comments:  
 Please evaluate patient for chronic low back and coccygeal pain due to lumbar neuritis/DDD. She reports that caudal GEOVANNI in the past has provided more than 6 months of excellent relief. Thank you in advance for again seeing this pleasant patient. Follow-up Instructions Return in about 3 months (around 11/24/2017). Referral Information Referral ID Referred By Referred To  
  
 [id number] Lo Romero MD   
   [address] [place] [address] Phone: [phone] Fax: [phone] Visits Status Start Date End Date 1 New Request 8/24/17 8/24/18 If your referral has a status of pending review or denied, additional information will be sent to support the outcome of this decision. Patient Instructions PLAN / Pt Instructions: 1. Continue current plan with no evidence of addiction or diversion. 2. Stable on current medication without adverse events. 3. Refilled  buprenorphine HCl (BELBUCA) 300 mcg film, 1 Film by Buccal route every twelve (12) hours 4. Refilled Hydrocodone/ Acetaminophen 10 mg/325 mg tablet  one tablet po two times daily prn 
5. Referral to Dr. Elroy Means for [facility] 6. Add compound cream, apply 2-3 times daily 7. Discussed risks of addiction, dependency, and opioid induced hyperalgesia. 8. Reviewed with patient benefits of home exercise, and lifestyle changes to assist the patient in self-management of symptoms. 9. Return to clinic in 3 months Preventing Falls: Care Instructions Your Care Instructions Getting around your home safely can be a challenge if you have injuries or health problems that make it easy for you to fall. Loose rugs and furniture in walkways are among the dangers for many older people who have problems walking or who have poor eyesight.  People who have conditions such as arthritis, osteoporosis, or dementia also have to be careful not to fall. You can make your home safer with a few simple measures. Follow-up care is a key part of your treatment and safety. Be sure to make and go to all appointments, and call your doctor if you are having problems. It's also a good idea to know your test results and keep a list of the medicines you take. How can you care for yourself at home? Taking care of yourself · You may get dizzy if you do not drink enough water. To prevent dehydration, drink plenty of fluids, enough so that your urine is light yellow or clear like water. Choose water and other caffeine-free clear liquids. If you have kidney, heart, or liver disease and have to limit fluids, talk with your doctor before you increase the amount of fluids you drink. · Exercise regularly to improve your strength, muscle tone, and balance. Walk if you can. Swimming may be a good choice if you cannot walk easily. · Have your vision and hearing checked each year or any time you notice a change. If you have trouble seeing and hearing, you might not be able to avoid objects and could lose your balance. · Know the side effects of the medicines you take. Ask your doctor or pharmacist whether the medicines you take can affect your balance. Sleeping pills or sedatives can affect your balance. · Limit the amount of alcohol you drink. Alcohol can impair your balance and other senses. · Ask your doctor whether calluses or corns on your feet need to be removed. If you wear loose-fitting shoes because of calluses or corns, you can lose your balance and fall. · Talk to your doctor if you have numbness in your feet. Preventing falls at home · Remove raised doorway thresholds, throw rugs, and clutter. Repair loose carpet or raised areas in the floor. · Move furniture and electrical cords to keep them out of walking paths.  
· Use nonskid floor wax, and wipe up spills right away, especially on ceramic tile floors. · If you use a walker or cane, put rubber tips on it. If you use crutches, clean the bottoms of them regularly with an abrasive pad, such as steel wool. · Keep your house well lit, especially Fre[place] Bam, and outside walkways. Use night-lights in areas such as hallways and bathrooms. Add extra light switches or use remote switches (such as switches that go on or off when you clap your hands) to make it easier to turn lights on if you have to get up during the night. · Install sturdy handrails on stairways. · Move items in your cabinets so that the things you use a lot are on the lower shelves (about waist level). · Keep a cordless phone and a flashlight with new batteries by your bed. If possible, put a phone in each of the main rooms of your house, or carry a cell phone in case you fall and cannot reach a phone. Or, you can wear a device around your neck or wrist. You push a button that sends a signal for help. · Wear low-heeled shoes that fit well and give your feet good support. Use footwear with nonskid soles. Check the heels and soles of your shoes for wear. Repair or replace worn heels or soles. · Do not wear socks without shoes on wood floors. · Walk on the grass when the sidewalks are slippery. If you live in an area that gets snow and ice in the winter, sprinkle salt on slippery steps and sidewalks. Preventing falls in the bath · Install grab bars and nonskid mats inside and outside your shower or tub and near the toilet and sinks. · Use shower chairs and bath benches. · Use a hand-held shower head that will allow you to sit while showering. · Get into a tub or shower by putting the weaker leg in first. Get out of a tub or shower with your strong side first. 
· Repair loose toilet seats and consider installing a raised toilet seat to make getting on and off the toilet easier. · Keep your bathroom door unlocked while you are in the shower. Where can you learn more? Go to http://rossana-christelle.info/. Enter [phone] [id number] [phone] in the search box to learn more about \"Preventing Falls: Care Instructions. \" Current as of: August 4, 2016 Content Version: 11.3 © 7656-6033 [employer]. Care instructions adapted under license by [employer] (which disclaims liability or warranty for this information). If you have questions about a medical condition or this instruction, always ask your healthcare professional. [address] any warranty or liability for your use of this information. Introducing [facility]! Richelle Macario introduces [employer] patient portal. Now you can access parts of your medical record, email your doctor's office, and request medication refills online. 1. In your internet browser, go to [email or web address] 2. Click on the First Time User? Click Here link in the Sign In box. You will see the New Member Sign Up page. 3. Enter your [employer]ailo Access Code exactly as it appears below. You will not need to use this code after youve completed the sign-up process. If you do not sign up before the expiration date, you must request a new code. · [employer]ailo Access Code: [email or web address] Expires: 11/22/2017  3:54 PM 
 
4. Enter the last four digits of your Social Security Number (xxxx) and Date of Birth (mm/dd/yyyy) as indicated and click Submit. You will be taken to the next sign-up page. 5. Create a Hailo ID. This will be your [employer]ailo login ID and cannot be changed, so think of one that is secure and easy to remember. 6. Create a Hailo password. You can change your password at any time. 7. Enter your Password Reset Question and Answer. This can be used at a later time if you forget your password. 8. Enter your e-mail address. You will receive e-mail notification when new information is available in [address] 9. Click Sign Up.  You can now view and download portions of your medical record. 10. Click the Download Summary menu link to download a portable copy of your medical information. If you have questions, please visit the Frequently Asked Questions section of the [employer] website. Remember, [employer] is NOT to be used for urgent needs. For medical emergencies, dial 911. Now available from your iPhone and Android! Please provide this summary of care documentation to your next provider. Your primary care clinician is listed as Kaitlin Granados. If you have any questions after today's visit, please call [phone].

## 2017-08-24 NOTE — PATIENT INSTRUCTIONS
PLAN / Pt Instructions:  1. Continue current plan with no evidence of addiction or diversion. 2. Stable on current medication without adverse events. 3. Refilled  buprenorphine HCl (BELBUCA) 300 mcg film, 1 Film by Buccal route every twelve (12) hours  4. Refilled Hydrocodone/ Acetaminophen 10 mg/325 mg tablet  one tablet po two times daily prn  5. Referral to Dr. Denita Hernandez for GEOVANNI   6. Add compound cream, apply 2-3 times daily prn  7. Discussed risks of addiction, dependency, and opioid induced hyperalgesia. 8. Reviewed with patient benefits of home exercise, and lifestyle changes to assist the patient in self-management of symptoms. 9. Return to clinic in 3 months     Preventing Falls: Care Instructions  Your Care Instructions  Getting around your home safely can be a challenge if you have injuries or health problems that make it easy for you to fall. Loose rugs and furniture in walkways are among the dangers for many older people who have problems walking or who have poor eyesight. People who have conditions such as arthritis, osteoporosis, or dementia also have to be careful not to fall. You can make your home safer with a few simple measures. Follow-up care is a key part of your treatment and safety. Be sure to make and go to all appointments, and call your doctor if you are having problems. It's also a good idea to know your test results and keep a list of the medicines you take. How can you care for yourself at home? Taking care of yourself  · You may get dizzy if you do not drink enough water. To prevent dehydration, drink plenty of fluids, enough so that your urine is light yellow or clear like water. Choose water and other caffeine-free clear liquids. If you have kidney, heart, or liver disease and have to limit fluids, talk with your doctor before you increase the amount of fluids you drink. · Exercise regularly to improve your strength, muscle tone, and balance. Walk if you can.  Swimming may be a good choice if you cannot walk easily. · Have your vision and hearing checked each year or any time you notice a change. If you have trouble seeing and hearing, you might not be able to avoid objects and could lose your balance. · Know the side effects of the medicines you take. Ask your doctor or pharmacist whether the medicines you take can affect your balance. Sleeping pills or sedatives can affect your balance. · Limit the amount of alcohol you drink. Alcohol can impair your balance and other senses. · Ask your doctor whether calluses or corns on your feet need to be removed. If you wear loose-fitting shoes because of calluses or corns, you can lose your balance and fall. · Talk to your doctor if you have numbness in your feet. Preventing falls at home  · Remove raised doorway thresholds, throw rugs, and clutter. Repair loose carpet or raised areas in the floor. · Move furniture and electrical cords to keep them out of walking paths. · Use nonskid floor wax, and wipe up spills right away, especially on ceramic tile floors. · If you use a walker or cane, put rubber tips on it. If you use crutches, clean the bottoms of them regularly with an abrasive pad, such as steel wool. · Keep your house well lit, especially Marya Fothergill, and outside walkways. Use night-lights in areas such as hallways and bathrooms. Add extra light switches or use remote switches (such as switches that go on or off when you clap your hands) to make it easier to turn lights on if you have to get up during the night. · Install sturdy handrails on stairways. · Move items in your cabinets so that the things you use a lot are on the lower shelves (about waist level). · Keep a cordless phone and a flashlight with new batteries by your bed. If possible, put a phone in each of the main rooms of your house, or carry a cell phone in case you fall and cannot reach a phone.  Or, you can wear a device around your neck or wrist. You push a button that sends a signal for help. · Wear low-heeled shoes that fit well and give your feet good support. Use footwear with nonskid soles. Check the heels and soles of your shoes for wear. Repair or replace worn heels or soles. · Do not wear socks without shoes on wood floors. · Walk on the grass when the sidewalks are slippery. If you live in an area that gets snow and ice in the winter, sprinkle salt on slippery steps and sidewalks. Preventing falls in the bath  · Install grab bars and nonskid mats inside and outside your shower or tub and near the toilet and sinks. · Use shower chairs and bath benches. · Use a hand-held shower head that will allow you to sit while showering. · Get into a tub or shower by putting the weaker leg in first. Get out of a tub or shower with your strong side first.  · Repair loose toilet seats and consider installing a raised toilet seat to make getting on and off the toilet easier. · Keep your bathroom door unlocked while you are in the shower. Where can you learn more? Go to http://rossana-christelle.info/. Enter [phone] in the search box to learn more about \"Preventing Falls: Care Instructions. \"  Current as of: August 4, 2016  Content Version: 11.3  © 9080-8914 [employer]. Care instructions adapted under license by [employer] (which disclaims liability or warranty for this information). If you have questions about a medical condition or this instruction, always ask your healthcare professional. [place] [address] any warranty or liability for your use of this information.

## 2017-08-24 NOTE — PROGRESS NOTES
HISTORY OF PRESENT ILLNESS  Whitney Rodas is a 45 y.o. female    HPI: Ms. Manav Burdick presents today for follow up of chronic widespread pain due to fibromyalgia and lumbar degenerative disc disease. She also suffers with bilateral knee pain due to osteoarthritis as well as periodic migraines. This is my first visit with this patient today. The patient denies any significant changes since last f/u. She reports that she has been in more pain since last visit since she was only given a 1 month prescription for her hydrocodone 10 mg tablets. She reports she has been able to stretch them out since her last visit. I have encouraged her to call the clinic in the future if there is a mistake so that it can be fixed. She was worried that it would look like she was drug-seeking if she called early. She also reports that her last caudal GEOVANNI injection she received by Dr. Analilia Hernández was painful during the injections but after a few days did provide good relief of her lower back pain for a few months. We did provide a new referral to Dr. Analilia Hernández for another [facility] per patient request.  We also provided a new prescription for compound cream to help with pain and inflammation in her back. Current medication management consists of:Belbuca 300 mcg BID, Hydrocodone/ Acetaminophen 10 mg/325 mg tablet  one tablet po two times daily prn  Medications are helping with pain control and quality of life. Her pain is 4/10 with medication and 10/10 without. Pt describes pain a radiating electrical ripple, stabbing, and burning. Aggravating factors include standing, sitting, and walking. Relieved with rest, medication, and avoiding painful activities. The patient reports 40% relief with current medications. She tolerates medications without side effects. Whitney Rodas reports no change in sleep or constipation.   Current treatment is helping to improve general activity, mood, walking, sleep, enjoyment of life    Measuring clinical outcomes of chronic pain patients: score 11/28; the lower the number the better the outcome. She  is otherwise doing well with no other complaints today. She denies any adverse events including nausea, vomiting, dizziness, increased constipation, hallucinations, or seizures. The patient reports functional improvement and QOL with pain medication. Vitals:    08/24/17 1425   BP: 141/78   Pulse: 73   Resp: 20   Temp: 98.1 °F (36.7 °C)   Weight: 142.9 kg (315 lb)   Height: 5' 7\" (1.702 m)   PainSc:   4   PainLoc: Back         Allergies   Allergen Reactions    Iodinated Contrast- Oral And Iv Dye Anaphylaxis    Iodine Anaphylaxis    Morphine Nausea and Vomiting    Nsaids (Non-Steroidal Anti-Inflammatory Drug) Other (comments)     Severe edema       History reviewed. No pertinent surgical history. Review of Systems   Constitutional: Positive for malaise/fatigue. Negative for chills, fever and weight loss. HENT: Positive for congestion. Negative for ear discharge, ear pain, hearing loss, nosebleeds, sinus pain and sore throat. Eyes: Negative for blurred vision, double vision and pain. Respiratory: Negative for cough and shortness of breath. Cardiovascular: Negative for chest pain and palpitations. Gastrointestinal: Positive for heartburn. Negative for constipation, diarrhea, nausea and vomiting. Musculoskeletal: Positive for back pain, joint pain, myalgias and neck pain. Negative for falls. Skin: Negative for itching and rash. Neurological: Negative for dizziness, tingling, tremors, seizures, loss of consciousness, weakness and headaches. Psychiatric/Behavioral: Positive for depression. Negative for suicidal ideas. The patient is nervous/anxious and has insomnia. Physical Exam   Constitutional: She is oriented to person, place, and time and well-developed, well-nourished, and in no distress. She appears healthy. Non-toxic appearance. No distress.    Obese appears anxious Eyes: Right eye exhibits no discharge. Left eye exhibits no discharge. Neck: Neck supple. Pulmonary/Chest: Effort normal.   Musculoskeletal: She exhibits tenderness. Right hip: She exhibits decreased strength and tenderness. Lumbar back: She exhibits tenderness, bony tenderness, pain and spasm. Right upper leg: She exhibits tenderness. Neurological: She is alert and oriented to person, place, and time. Skin: Skin is warm. She is not diaphoretic. Psychiatric: Affect normal. Her mood appears anxious. Nursing note and vitals reviewed. ASSESSMENT:    1. Lumbar degenerative disc disease    2. Pes anserinus bursitis of left knee    3. Chronic right-sided low back pain with right-sided sciatica    4. Bulging lumbar disc    5. DDD (degenerative disc disease), lumbar    6. Fibromyalgia syndrome    7. Lumbago of lumbosacaral region with sciatica    8. Severe obesity (BMI >= 40) (Formerly [facility])    9. Coccydynia    10. Chronic pelvic pain in female    6. Pes anserine bursitis    12. Encounter for long-term (current) use of high-risk medication          [place] Prescription Monitoring Program was reviewed which does not demonstrate aberrancies and/or inconsistencies with regard to the historical prescribing of controlled medications to this patient by other providers. Medications were brought to visit today. Pill count was appropriate. The patients condition and plan were discussed. All questions were answered. The patient agrees with the plan. PLAN / Pt Instructions:  1. Continue current plan with no evidence of addiction or diversion. 2. Stable on current medication without adverse events. 3. Refilled  buprenorphine HCl (BELBUCA) 300 mcg film, 1 Film by Buccal route every twelve (12) hours  4. Refilled Hydrocodone/ Acetaminophen 10 mg/325 mg tablet  one tablet po two times daily prn  5. Referral to Dr. Yakov Jeter for GEOVANNI   6. Add compound cream, apply 2-3 times daily  7.  Discussed risks of addiction, dependency, and opioid induced hyperalgesia. 8. Reviewed with patient benefits of home exercise, and lifestyle changes to assist the patient in self-management of symptoms. 9. Return to clinic in 3 months      Medications Ordered Today   Medications    HYDROcodone-acetaminophen (NORCO)  mg tablet     Sig: Take 1 Tab by mouth two (2) times daily as needed for Pain. Max Daily Amount: 2 Tabs. Dispense:  60 Tab     Refill:  0    HYDROcodone-acetaminophen (NORCO)  mg tablet     Sig: Take 1 Tab by mouth two (2) times daily as needed for Pain. Max Daily Amount: 2 Tabs. Note dose change     Dispense:  60 Tab     Refill:  0    HYDROcodone-acetaminophen (NORCO)  mg tablet     Sig: Take 1 Tab by mouth two (2) times daily as needed for Pain. Max Daily Amount: 2 Tabs. Note dose change     Dispense:  60 Tab     Refill:  0    buprenorphine HCl (BELBUCA) 300 mcg film     Si Film by Buccal route every twelve (12) hours. Max Daily Amount: 2 Film. for chronic, severe, refractory pain     Dispense:  60 Film     Refill:  2       Prescription monitoring program reviewed. The patient  should keep track of total Tylenol intake and make sure liver function tests have been checked with primary care physician. Pain medications prescribed with the objective of pain relief and improved physical and psychosocial function in this patient. DISPOSITION  · Counseled patient on proper use of prescribed medications. · Counseled patient about chronic medical conditions and their relationship to anxiety and depression and recommended mental health support as needed. · Reviewed with patient self-help tools, home exercise, and lifestyle changes to assist the patient in self-management of symptoms. · Reviewed with patient the treatment plan, goals of treatment plan, and limitations of treatment plan, to include the potential for side effects from medications and procedures.   If side effects occur, it is the responsibility of the patient to inform the clinic so that a change in the treatment plan can be made in a safe manner. The patient is advised that stopping prescribed medication may cause an increase in symptoms and possible medication withdrawal symptoms. The patient is informed an emergency room evaluation may be necessary if this occurs. Spent 25 minutes with patient today reviewing the treatment plan, goals of treatment plan, and limitations of the treatment plan, to include the potential for side effects from medications and procedures. David Manning PA-C 8/24/2017        Note: Please excuse any typographical errors. Voice recognition software was used for this note and may cause mistakes.

## 2017-11-15 ENCOUNTER — TELEPHONE (OUTPATIENT)
Dept: PAIN MANAGEMENT | Age: 52
End: 2017-11-15

## 2017-11-15 NOTE — TELEPHONE ENCOUNTER
Patient called the office and stated that she is having cluster headaches and is unable to drive but she needs a bridge on her medications. I called the patient to speak with her she said she has an appt on 12/06.

## 2017-12-12 ENCOUNTER — OFFICE VISIT (OUTPATIENT)
Dept: PAIN MANAGEMENT | Age: 52
End: 2017-12-12

## 2017-12-12 VITALS
WEIGHT: 293 LBS | BODY MASS INDEX: 45.99 KG/M2 | DIASTOLIC BLOOD PRESSURE: 84 MMHG | HEART RATE: 107 BPM | SYSTOLIC BLOOD PRESSURE: 142 MMHG | HEIGHT: 67 IN | RESPIRATION RATE: 18 BRPM | TEMPERATURE: 97.7 F

## 2017-12-12 DIAGNOSIS — G89.29 CHRONIC BILATERAL LOW BACK PAIN WITHOUT SCIATICA: ICD-10-CM

## 2017-12-12 DIAGNOSIS — R10.9 ABDOMINAL PAIN OF MULTIPLE SITES: ICD-10-CM

## 2017-12-12 DIAGNOSIS — M51.36 DDD (DEGENERATIVE DISC DISEASE), LUMBAR: Primary | ICD-10-CM

## 2017-12-12 DIAGNOSIS — R10.2 CHRONIC PELVIC PAIN IN FEMALE: ICD-10-CM

## 2017-12-12 DIAGNOSIS — G89.29 CHRONIC PAIN OF LEFT KNEE: ICD-10-CM

## 2017-12-12 DIAGNOSIS — G89.29 CHRONIC PELVIC PAIN IN FEMALE: ICD-10-CM

## 2017-12-12 DIAGNOSIS — M79.7 FIBROMYALGIA SYNDROME: ICD-10-CM

## 2017-12-12 DIAGNOSIS — M25.562 CHRONIC PAIN OF LEFT KNEE: ICD-10-CM

## 2017-12-12 DIAGNOSIS — M54.50 CHRONIC BILATERAL LOW BACK PAIN WITHOUT SCIATICA: ICD-10-CM

## 2017-12-12 DIAGNOSIS — M51.36 BULGING LUMBAR DISC: ICD-10-CM

## 2017-12-12 DIAGNOSIS — M53.3 COCCYDYNIA: ICD-10-CM

## 2017-12-12 DIAGNOSIS — M54.40 LUMBAGO OF LUMBOSACARAL REGION WITH SCIATICA: ICD-10-CM

## 2017-12-12 DIAGNOSIS — Z79.899 ENCOUNTER FOR LONG-TERM (CURRENT) USE OF HIGH-RISK MEDICATION: ICD-10-CM

## 2017-12-12 LAB
ALCOHOL UR POC: NORMAL
AMPHETAMINES UR POC: NEGATIVE
BARBITURATES UR POC: NEGATIVE
BENZODIAZEPINES UR POC: NEGATIVE
BUPRENORPHINE UR POC: NORMAL
CANNABINOIDS UR POC: NEGATIVE
CARISOPRODOL UR POC: NORMAL
COCAINE UR POC: NEGATIVE
FENTANYL UR POC: NORMAL
MDMA/ECSTASY UR POC: NEGATIVE
METHADONE UR POC: NEGATIVE
METHAMPHETAMINE UR POC: NEGATIVE
METHYLPHENIDATE UR POC: NORMAL
OPIATES UR POC: NEGATIVE
OXYCODONE UR POC: NORMAL
PHENCYCLIDINE UR POC: NEGATIVE
PROPOXYPHENE UR POC: NORMAL
TRAMADOL UR POC: NORMAL
TRICYCLICS UR POC: NEGATIVE

## 2017-12-12 RX ORDER — NALOXONE HYDROCHLORIDE 4 MG/.1ML
1 SPRAY NASAL AS NEEDED
Qty: 1 EACH | Refills: 0 | Status: SHIPPED | OUTPATIENT
Start: 2017-12-12 | End: 2019-02-13 | Stop reason: SDUPTHER

## 2017-12-12 RX ORDER — HYDROCODONE BITARTRATE AND ACETAMINOPHEN 10; 325 MG/1; MG/1
1 TABLET ORAL
Qty: 60 TAB | Refills: 0 | Status: SHIPPED | OUTPATIENT
Start: 2018-01-06 | End: 2018-03-14 | Stop reason: SDUPTHER

## 2017-12-12 RX ORDER — BUPRENORPHINE HCL 300 MCG
1 FILM, MEDICATED (EA) BUCCAL EVERY 12 HOURS
Qty: 180 FILM | Refills: 0 | Status: SHIPPED | OUTPATIENT
Start: 2017-12-14 | End: 2018-03-14 | Stop reason: SDUPTHER

## 2017-12-12 RX ORDER — HYDROCODONE BITARTRATE AND ACETAMINOPHEN 10; 325 MG/1; MG/1
1 TABLET ORAL
Qty: 60 TAB | Refills: 0 | Status: SHIPPED | OUTPATIENT
Start: 2018-02-05 | End: 2018-03-14 | Stop reason: SDUPTHER

## 2017-12-12 NOTE — MR AVS SNAPSHOT
Visit Information Date & Time Provider Department Dept. Phone Encounter #  
 12/12/2017 10:40 AM Lucita Vickers [facility] [id number] Follow-up Instructions Return in about 3 months (around 3/12/2018). Upcoming Health Maintenance Date Due Hepatitis C Screening 1965 DTaP/Tdap/Td series (1 - Tdap) 8/20/1984 PAP AKA CERVICAL CYTOLOGY 8/20/1984 BREAST CANCER SCRN MAMMOGRAM 8/20/2013 FOBT Q 1 YEAR AGE 50-75 8/20/2013 Influenza Age 5 to Adult 8/1/2017 Allergies as of 12/12/2017  Review Complete On: 12/12/2017 By: Linda Espitia PA-C Severity Noted Reaction Type Reactions Iodinated Contrast- Oral And Iv Dye High 06/25/2013   Systemic Anaphylaxis Iodine    Anaphylaxis Morphine    Nausea and Vomiting Nsaids (Non-steroidal Anti-inflammatory Drug)    Other (comments) Severe edema Current Immunizations  Never Reviewed No immunizations on file. Not reviewed this visit You Were Diagnosed With   
  
 Codes Comments DDD (degenerative disc disease), lumbar    -  Primary ICD-10-CM: M51.36 
ICD-9-CM: 722.52 Abdominal pain of multiple sites     ICD-10-CM: R10.9 ICD-9-CM: 789.09 Chronic pelvic pain in female     ICD-10-CM: R10.2, G89.29 ICD-9-CM: 625.9, 338.29 Fibromyalgia syndrome     ICD-10-CM: M79.7 ICD-9-CM: 729.1 Coccydynia     ICD-10-CM: M53.3 ICD-9-CM: 724.79 Lumbago of lumbosacaral region with sciatica     ICD-10-CM: M54.40 ICD-9-CM: 724.2, 724.3 Chronic bilateral low back pain without sciatica     ICD-10-CM: M54.5, G89.29 ICD-9-CM: 724.2, 338.29 Chronic pain of left knee     ICD-10-CM: M25.562, G89.29 ICD-9-CM: 719.46, 338.29 Bulging lumbar disc     ICD-10-CM: M51.26 
ICD-9-CM: 722.10 Encounter for long-term (current) use of high-risk medication     ICD-10-CM: Z79.899 ICD-9-CM: V58.69 Vitals BP Pulse Temp Resp Height(growth percentile) Weight(growth percentile) 142/84 (BP 1 Location: Right arm, BP Patient Position: Sitting) (!) 107 97.7 °F (36.5 °C) (Oral) 18 5' 7\" (1.702 m) 315 lb (142.9 kg) BMI OB Status Smoking Status 49.34 kg/m2 Hysterectomy Never Smoker BMI and BSA Data Body Mass Index Body Surface Area  
 49.34 kg/m 2 2.6 m 2 Preferred Pharmacy Pharmacy Name Phone [facility] [facility] [phone] Your Updated Medication List  
  
   
This list is accurate as of: 12/12/17 11:46 AM.  Always use your most recent med list.  
  
  
  
  
 albuterol 90 mcg/actuation inhaler Commonly known as:  PROVENTIL HFA, VENTOLIN HFA, PROAIR HFA Take 1 Puff by inhalation. * buprenorphine HCl 300 mcg Film Commonly known as:  BELBUCA  
1 Film by Buccal route every twelve (12) hours. Max Daily Amount: 2 Film. for chronic, severe, refractory pain * buprenorphine HCl 300 mcg Film Commonly known as:  BELBUCA  
1 Film by Buccal route every twelve (12) hours for 90 days. Max Daily Amount: 2 Film. for chronic, severe, refractory pain. 90 day bulk for mail order Start taking on:  12/14/2017  
  
 calcium-cholecalciferol (D3) tablet Commonly known as:  CALTRATE 600+D Take 1 Tab by mouth daily. CINNAMON PO Take  by mouth. * CYMBALTA 30 mg capsule Generic drug:  DULoxetine Take 30 mg by mouth three (3) times daily. * CYMBALTA 60 mg capsule Generic drug:  DULoxetine Take 60 mg by mouth daily. diphenhydrAMINE 50 mg tablet Commonly known as:  BENADRYL Take 50 mg by mouth nightly as needed. Flaxseed Oil Oil  
by Does Not Apply route.  
  
 garlic 1 mg Cap Take  by mouth.  
  
 * HYDROcodone-acetaminophen  mg tablet Commonly known as:  Juanetta Rasp Take 1 Tab by mouth two (2) times daily as needed for Pain. Max Daily Amount: 2 Tabs. * HYDROcodone-acetaminophen  mg tablet Commonly known as:  Noa Arts Take 1 Tab by mouth two (2) times daily as needed for Pain. Max Daily Amount: 2 Tabs. Note dose change * HYDROcodone-acetaminophen  mg tablet Commonly known as:  Noa Arts Take 1 Tab by mouth two (2) times daily as needed for Pain. Max Daily Amount: 2 Tabs. * HYDROcodone-acetaminophen  mg tablet Commonly known as:  Noa Arts Take 1 Tab by mouth two (2) times daily as needed for Pain. Max Daily Amount: 2 Tabs. Note dose change * HYDROcodone-acetaminophen  mg tablet Commonly known as:  Noa Arts Take 1 Tab by mouth two (2) times daily as needed for Pain. Max Daily Amount: 2 Tabs. Start taking on:  1/6/2018  
  
 * HYDROcodone-acetaminophen  mg tablet Commonly known as:  Noa Arts Take 1 Tab by mouth two (2) times daily as needed for Pain. Max Daily Amount: 2 Tabs. Note dose change Start taking on:  2/5/2018 INDERAL LA 60 mg SR capsule Generic drug:  propranolol LA Take  by mouth daily. levothyroxine 25 mcg tablet Commonly known as:  SYNTHROID Take 0.045 mcg by mouth Daily (before breakfast). lidocaine hcl (pf) 200 mg/mL (20 %) Soln  
by IntraVENous route. LIPITOR PO Take  by mouth.  
  
 metFORMIN 500 mg tablet Commonly known as:  GLUCOPHAGE Take  by mouth two (2) times daily (with meals). naloxone 4 mg/actuation nasal spray Commonly known as:  NARCAN  
1 [place] by IntraNASal route as needed. As needed for opioid respiratory emergency only  
  
 omeprazole 20 mg capsule Commonly known as:  PRILOSEC Take 20 mg by mouth daily. promethazine 25 mg tablet Commonly known as:  PHENERGAN Take 25 mg by mouth every six (6) hours as needed. RELPAX 40 mg tablet Generic drug:  eletriptan Take 40 mg by mouth once as needed. may repeat in 2 hours if necessary VITAMIN D2 50,000 unit capsule Generic drug:  ergocalciferol Take 50,000 Units by mouth.  
  
 vitamin E 400 unit capsule Commonly known as:  [address] Take  by mouth daily. * Notice: This list has 10 medication(s) that are the same as other medications prescribed for you. Read the directions carefully, and ask your doctor or other care provider to review them with you. Prescriptions Printed Refills  
 buprenorphine HCl (BELBUCA) 300 mcg film 0 Starting on: 2017 Si Film by Buccal route every twelve (12) hours for 90 days. Max Daily Amount: 2 Film. for chronic, severe, refractory pain. 90 day bulk for mail order Class: Print Route: Buccal  
 HYDROcodone-acetaminophen (NORCO)  mg tablet 0 Starting on: 2018 Sig: Take 1 Tab by mouth two (2) times daily as needed for Pain. Max Daily Amount: 2 Tabs. Note dose change Class: Print Route: Oral  
 HYDROcodone-acetaminophen (NORCO)  mg tablet 0 Starting on: 2018 Sig: Take 1 Tab by mouth two (2) times daily as needed for Pain. Max Daily Amount: 2 Tabs. Class: Print Route: Oral  
 naloxone (NARCAN) 4 mg/actuation nasal spray 0 [place] by IntraNASal route as needed. As needed for opioid respiratory emergency only Class: Print Route: IntraNASal  
  
We Performed the Following AMB POC DRUG SCREEN () [ [facility]] DRUG SCREEN [[id number] Custom] Follow-up Instructions Return in about 3 months (around 3/12/2018). Introducing [facility]! Sabra Diallo introduces [employer] patient portal. Now you can access parts of your medical record, email your doctor's office, and request medication refills online. 1. In your internet browser, go to [email or web address] 2. Click on the First Time User? Click Here link in the Sign In box. You will see the New Member Sign Up page. 3. Enter your [employer] Access Code exactly as it appears below.  You will not need to use this code after youve completed the sign-up process. If you do not sign up before the expiration date, you must request a new code. · [employer] Access Code: [email or web address] Expires: 3/12/2018 11:46 AM 
 
4. Enter the last four digits of your Social Security Number (xxxx) and Date of Birth (mm/dd/yyyy) as indicated and click Submit. You will be taken to the next sign-up page. 5. Create a [employer] ID. This will be your [employer] login ID and cannot be changed, so think of one that is secure and easy to remember. 6. Create a [employer] password. You can change your password at any time. 7. Enter your Password Reset Question and Answer. This can be used at a later time if you forget your password. 8. Enter your e-mail address. You will receive e-mail notification when new information is available in [address]. 9. Click Sign Up. You can now view and download portions of your medical record. 10. Click the Download Summary menu link to download a portable copy of your medical information. If you have questions, please visit the Frequently Asked Questions section of the [employer] website. Remember, [employer] is NOT to be used for urgent needs. For medical emergencies, dial 911. Now available from your iPhone and Android! Please provide this summary of care documentation to your next provider. Your primary care clinician is listed as Megan Lam. If you have any questions after today's visit, please call [phone].

## 2017-12-12 NOTE — PROGRESS NOTES
Nursing Notes    Patient presents to the office today in follow-up. Patient rates her pain at 5/10 on the numerical pain scale. Reviewed medications with counts as follows:    Rx Date filled Qty Dispensed Pill Count Last Dose Short   norco  mg 12/7/17 60 48 Last night no   belbuca 300mcg 11/15/17 60 42 This a.m no                                POC UDS was performed in office today    Any new labs or imaging since last appointment? NO    Have you been to an emergency room (ER) or urgent care clinic since your last visit? NO            Have you been hospitalized since your last visit? NO     If yes, where, when, and reason for visit? Have you seen or consulted any other health care providers outside of the [address] [place] [address]  since your last visit? YES, PCP     If yes, where, when, and reason for visit? HM deferred to pcp.

## 2017-12-12 NOTE — PROGRESS NOTES
HISTORY OF PRESENT ILLNESS  Colette Pulliam is a 45 y.o. female    Ms. Kayy Demraco presents today for follow up of chronic widespread pain due to fibromyalgia and lumbar degenerative disc disease. She also suffers with bilateral knee pain due to osteoarthritis as well as periodic migraines. The patient denies any significant changes since last f/u. We discussed her current condition and medications in detail today. She tolerates medications without side effects. Patient reports she had to pay an extra $100 because her prescription was not written for 90 day. I will write her prescription for 90 days this visit. Colette Pulliam reports no change in sleep or constipation. Current treatment is helping to improve general activity, mood, walking, sleep, enjoyment of life    Current medication management consists of: Belbuca 300 mcg every 12 hours, Hydrocodone/ Acetaminophen 10 mg/325 mg tablet  one tablet two times daily prn, compound cream use 3-4 times daily as needed for joint pain  Medications are helping with pain control and quality of life. Her pain is 4/10 with medication and 10/10 without. Pt describes pain a radiating electrical ripple, stabbing, and burning. Aggravating factors include standing, sitting, and walking. Relieved with rest, medication, and avoiding painful activities. The patient reports 40% relief with current medications. Measuring clinical outcomes of chronic pain patients: score 11/28; the lower the number the better the outcome. Pain Meds and Quality Of Life have been reviewed. Nonpharmacologic therapy and non-opioid pharmacologic therapy were considered. If opioid therapy is prescribed, this is only if the expected benefits are anticipated to outweigh risks. She  is otherwise doing well with no other complaints today. She denies any adverse events including nausea, vomiting, dizziness, increased constipation, hallucinations, or seizures.      The patient reports functional improvement and QOL with pain medication. Vitals:    12/12/17 1111   BP: 142/84   Pulse: (!) 107   Resp: 18   Temp: 97.7 °F (36.5 °C)   TempSrc: Oral   Weight: 142.9 kg (315 lb)   Height: 5' 7\" (1.702 m)   PainSc:   5   PainLoc: Back         Allergies   Allergen Reactions    Iodinated Contrast- Oral And Iv Dye Anaphylaxis    Iodine Anaphylaxis    Morphine Nausea and Vomiting    Nsaids (Non-Steroidal Anti-Inflammatory Drug) Other (comments)     Severe edema       History reviewed. No pertinent surgical history. ROS   Constitutional: Positive for malaise/fatigue. Negative for chills, fever and weight loss. HENT: Positive for congestion. Negative for ear discharge, ear pain, hearing loss, nosebleeds, sinus pain and sore throat. Eyes: Negative for blurred vision, double vision and pain. Respiratory: Negative for cough and shortness of breath. Cardiovascular: Negative for chest pain and palpitations. Gastrointestinal: Positive for heartburn. Negative for constipation, diarrhea, nausea and vomiting. Musculoskeletal: Positive for back pain, joint pain, myalgias and neck pain. Negative for falls. Skin: Negative for itching and rash. Neurological: Negative for dizziness, tingling, tremors, seizures, loss of consciousness, weakness and headaches. Psychiatric/Behavioral: Positive for depression. Negative for suicidal ideas. The patient is nervous/anxious and has insomnia. Physical Exam   Constitutional: She is oriented to person, place, and time and well-developed, well-nourished, and in no distress. She appears healthy. Non-toxic appearance. No distress. Obese appears anxious   Eyes: Right eye exhibits no discharge. Left eye exhibits no discharge. Neck: Neck supple. Pulmonary/Chest: Effort normal.   Musculoskeletal: She exhibits tenderness. Right hip: She exhibits decreased strength and tenderness.         Lumbar back: She exhibits tenderness, bony tenderness, pain and spasm. Right upper leg: She exhibits tenderness. Neurological: She is alert and oriented to person, place, and time. Skin: Skin is warm. She is not diaphoretic. Psychiatric: Affect normal. Her mood appears anxious. Nursing note and vitals reviewed. ASSESSMENT:    1. DDD (degenerative disc disease), lumbar    2. Abdominal pain of multiple sites    3. Chronic pelvic pain in female    4. Fibromyalgia syndrome    5. Coccydynia    6. Lumbago of lumbosacaral region with sciatica    7. Chronic bilateral low back pain without sciatica    8. Chronic pain of left knee    9. Bulging lumbar disc    10. Encounter for long-term (current) use of high-risk medication          [place] Prescription Monitoring Program was reviewed which does not demonstrate aberrancies and/or inconsistencies with regard to the historical prescribing of controlled medications to this patient by other providers. Medications were brought to visit today. Pill count was appropriate. When possible, non-drug therapy for chronic pain should be used as a first-line treatment. Physical therapy exercise regimens, chiropractic manipulation, meditation relaxation techniques, cognitive behavior therapy, acupuncture, yoga, Ajay Chi,  transcutaneous electrical nerve stimulation (TENS), and application of moist heat can help alleviate pain . Explained that realistic expectations and goals with chronic pain management are to maximize function and minimize pain with the understanding that limitations will exist both in the extent of relief that she may achieve, as well as thresholds of mg strengths that we will not exceed. Our role is to help the patient better cope with chronic pain utilizng a multimodal approach. The patients condition and plan were discussed. All questions were answered. The patient agrees with the plan. PLAN / Pt Instructions:  1. Continue current plan with no evidence of addiction or diversion.    2. Stable on current medication without adverse events. 3. Refilled  buprenorphine HCl (BELBUCA) 300 mcg film, 1 Film by Buccal route every twelve (12) hours  4. Refilled Hydrocodone/ Acetaminophen 10 mg/325 mg tablet  one tablet two times daily prn   5. Continue compound cream, apply 2-3 times daily  6. Discussed risks of addiction, dependency, and opioid induced hyperalgesia. 7. Reviewed with patient benefits of home exercise, and lifestyle changes to assist in self-management of symptoms. 8. Return to clinic in 3 months      Prescription monitoring program reviewed. The patient  should keep track of total Tylenol intake and make sure liver function tests have been checked with primary care physician. POC UDS today. Pain medications prescribed with the objective of pain relief and improved physical and psychosocial function in this patient. DISPOSITION  · Counseled patient on proper use of prescribed medications. · Counseled patient about chronic medical conditions and their relationship to anxiety and depression and recommended mental health support as needed. · Reviewed with patient self-help tools, home exercise, and lifestyle changes to assist the patient in self-management of symptoms. · Reviewed with patient the treatment plan, goals of treatment plan, and limitations of treatment plan, to include the potential for side effects from medications and procedures. If side effects occur, it is the responsibility of the patient to inform the clinic so that a change in the treatment plan can be made in a safe manner. The patient is advised that stopping prescribed medication may cause an increase in symptoms and possible medication withdrawal symptoms. The patient is informed an emergency room evaluation may be necessary if this occurs.         Spent 25 minutes with patient today reviewing the treatment plan, goals of treatment plan, and limitations of the treatment plan, to include the potential for side effects from medications and procedures. More than 50% of the visit time was spent counseling the patient. Arslan Maza PA-C 12/12/2017        Note: Please excuse any typographical errors. Voice recognition software was used for this note and may cause mistakes.

## 2018-03-14 ENCOUNTER — OFFICE VISIT (OUTPATIENT)
Dept: PAIN MANAGEMENT | Age: 53
End: 2018-03-14

## 2018-03-14 VITALS
WEIGHT: 293 LBS | DIASTOLIC BLOOD PRESSURE: 97 MMHG | HEART RATE: 80 BPM | HEIGHT: 67 IN | TEMPERATURE: 98.6 F | SYSTOLIC BLOOD PRESSURE: 168 MMHG | BODY MASS INDEX: 45.99 KG/M2 | RESPIRATION RATE: 18 BRPM

## 2018-03-14 DIAGNOSIS — G89.29 CHRONIC BILATERAL LOW BACK PAIN WITHOUT SCIATICA: ICD-10-CM

## 2018-03-14 DIAGNOSIS — R10.2 CHRONIC PELVIC PAIN IN FEMALE: ICD-10-CM

## 2018-03-14 DIAGNOSIS — M51.36 DDD (DEGENERATIVE DISC DISEASE), LUMBAR: Primary | ICD-10-CM

## 2018-03-14 DIAGNOSIS — M54.50 CHRONIC BILATERAL LOW BACK PAIN WITHOUT SCIATICA: ICD-10-CM

## 2018-03-14 DIAGNOSIS — R10.9 ABDOMINAL PAIN OF MULTIPLE SITES: ICD-10-CM

## 2018-03-14 DIAGNOSIS — M53.3 COCCYDYNIA: ICD-10-CM

## 2018-03-14 DIAGNOSIS — M51.36 LUMBAR DEGENERATIVE DISC DISEASE: ICD-10-CM

## 2018-03-14 DIAGNOSIS — M25.562 CHRONIC PAIN OF LEFT KNEE: ICD-10-CM

## 2018-03-14 DIAGNOSIS — M79.7 FIBROMYALGIA SYNDROME: ICD-10-CM

## 2018-03-14 DIAGNOSIS — G89.29 CHRONIC PAIN OF LEFT KNEE: ICD-10-CM

## 2018-03-14 DIAGNOSIS — G89.29 CHRONIC PELVIC PAIN IN FEMALE: ICD-10-CM

## 2018-03-14 RX ORDER — BUPRENORPHINE HCL 300 MCG
1 FILM, MEDICATED (EA) BUCCAL EVERY 12 HOURS
Qty: 180 FILM | Refills: 0 | Status: SHIPPED | OUTPATIENT
Start: 2018-03-14 | End: 2018-06-12

## 2018-03-14 RX ORDER — HYDROCODONE BITARTRATE AND ACETAMINOPHEN 10; 325 MG/1; MG/1
1 TABLET ORAL
Qty: 60 TAB | Refills: 0 | Status: SHIPPED | OUTPATIENT
Start: 2018-03-14 | End: 2018-08-24

## 2018-03-14 RX ORDER — HYDROCODONE BITARTRATE AND ACETAMINOPHEN 10; 325 MG/1; MG/1
1 TABLET ORAL
Qty: 60 TAB | Refills: 0 | Status: SHIPPED | OUTPATIENT
Start: 2018-04-13 | End: 2018-08-24

## 2018-03-14 RX ORDER — HYDROCODONE BITARTRATE AND ACETAMINOPHEN 10; 325 MG/1; MG/1
1 TABLET ORAL
Qty: 60 TAB | Refills: 0 | Status: SHIPPED | OUTPATIENT
Start: 2018-05-12 | End: 2018-08-24

## 2018-03-14 NOTE — PROGRESS NOTES
Nursing Notes    Patient presents to the office today in follow-up. Patient rates her pain at 2/10 on the numerical pain scale. Reviewed medications with counts as follows:    Rx Date filled Qty Dispensed Pill Count Last Dose Short   Norco  mg+1 prescription 01/22/18 60 19 This a.m no   belbuca 300 mcg 12/26/17 60 43 today no                                POC UDS was not performed in office today    Any new labs or imaging since last appointment? NO    Have you been to an emergency room (ER) or urgent care clinic since your last visit? NO            Have you been hospitalized since your last visit? NO     If yes, where, when, and reason for visit? Have you seen or consulted any other health care providers outside of the [address] [place] [address]  since your last visit? NO     If yes, where, when, and reason for visit? HM deferred to pcp.

## 2018-03-14 NOTE — PROGRESS NOTES
HISTORY OF PRESENT ILLNESS  Liliam Lemus is a 45 y.o. female    Ms. Nabeel Johnson presents today for follow up of chronic widespread pain due to fibromyalgia and lumbar degenerative disc disease. She also suffers with bilateral knee pain due to osteoarthritis as well as periodic migraines.      The patient denies any significant changes in her chronic pain since last f/u. Her last PCP follow-up visit was in February. She was told she has a lipoma in her right medial transverse forearm. This is causing her some discomfort and she is considering possible surgery to remove this lipoma. We discussed her current condition and medications in detail today. She tolerates medications without side effects. Carissa DENI Coy reports no change in sleep or constipation.  Current treatment is helping to improve general activity, mood, walking, sleep, enjoyment of life    Current medication management consists of: Belbuca 300 mcg every 12 hours, Hydrocodone/ Acetaminophen 10 mg/325 mg tablet  one tablet two times daily prn, compound cream use 3-4 times daily as needed for joint pain  Medications are helping with pain control and quality of life. Her pain is 4/10 with medication and 10/10 without.  Pt describes pain a radiating electrical ripple, stabbing, and burning. Aggravating factors include standing, sitting, and walking. Relieved with rest, medication, and avoiding painful activities. The patient reports 40% relief with current medications.       Measuring clinical outcomes of chronic pain patients: score 8/28; the lower the number the better the outcome. Because the patient's current regimen places him/her at increased risk for possible overdose, a prescription for naloxone nasal spray is being provided. The patient understands that this medication is only to be used in the setting of a possible overdose and that i     Pain Meds and Quality Of Life have been reviewed. Nonpharmacologic therapy and non-opioid pharmacologic therapy were considered. If opioid therapy is prescribed, this is only if the expected benefits are anticipated to outweigh risks. She  is otherwise doing well with no other complaints today. She denies any adverse events including nausea, vomiting, dizziness, increased constipation, hallucinations, or seizures. The patient reports functional improvement and QOL with pain medication. Vitals:    03/14/18 1331   BP: (!) 168/97   Pulse: 80   Resp: 18   Temp: 98.6 °F (37 °C)   TempSrc: Oral   Weight: 142.9 kg (315 lb)   Height: 5' 7\" (1.702 m)   PainSc:   2   PainLoc: Hip         Allergies   Allergen Reactions    Iodinated Contrast- Oral And Iv Dye Anaphylaxis    Iodine Anaphylaxis    Morphine Nausea and Vomiting    Nsaids (Non-Steroidal Anti-Inflammatory Drug) Other (comments)     Severe edema       No past surgical history on file. ROS   Constitutional: Positive for malaise/fatigue. Negative for chills, fever and weight loss. HENT: Positive for congestion. Negative for ear discharge, ear pain, hearing loss, nosebleeds, sinus pain and sore throat.    Eyes: Negative for blurred vision, double vision and pain. Respiratory: Negative for cough and shortness of breath.    Cardiovascular: Negative for chest pain and palpitations. Gastrointestinal: Positive for heartburn. Negative for constipation, diarrhea, nausea and vomiting. Musculoskeletal: Positive for back pain, joint pain, myalgias and neck pain. Negative for falls. Skin: Negative for itching and rash. Neurological: Negative for dizziness, tingling, tremors, seizures, loss of consciousness, weakness and headaches. Psychiatric/Behavioral: Positive for depression. Negative for suicidal ideas. The patient is nervous/anxious and has insomnia.      Physical Exam   Constitutional: She is oriented to person, place, and time and well-developed, well-nourished, and in no distress. She appears healthy.  Non-toxic appearance. No distress. Obese appears anxious   Eyes: Right eye exhibits no discharge. Left eye exhibits no discharge. Neck: Neck supple. Pulmonary/Chest: Effort normal.   Musculoskeletal: She exhibits tenderness.        Right hip: She exhibits decreased strength and tenderness.        Lumbar back: She exhibits tenderness, bony tenderness, pain and spasm.        Right upper leg: She exhibits tenderness. Neurological: She is alert and oriented to person, place, and time. Skin: Skin is warm. She is not diaphoretic. Psychiatric: Affect normal. Her mood appears anxious. Nursing note and vitals reviewed    ASSESSMENT:    1. DDD (degenerative disc disease), lumbar    2. Lumbar degenerative disc disease    3. Fibromyalgia syndrome    4. Coccydynia    5. Chronic pain of left knee    6. Chronic bilateral low back pain without sciatica    7. Chronic pelvic pain in female    8. Abdominal pain of multiple sites          [facility] Program was reviewed which does not demonstrate aberrancies and/or inconsistencies with regard to the historical prescribing of controlled medications to this patient by other providers. Medications were brought to visit today. Pill count was appropriate. When possible, non-drug therapy for chronic pain should be used as a first-line treatment. Physical therapy exercise regimens, chiropractic manipulation, meditation relaxation techniques, cognitive behavior therapy, acupuncture, yoga, Ajay Chi,  transcutaneous electrical nerve stimulation (TENS), and application of moist heat can help alleviate pain . Explained that realistic expectations and goals with chronic pain management are to maximize function and minimize pain with the understanding that limitations will exist both in the extent of relief that she may achieve, as well as thresholds of mg strengths that we will not exceed.  Our role is to help the patient better cope with chronic pain utilizng a multimodal approach. The patients condition and plan were discussed. All questions were answered. The patient agrees with the plan. PLAN / Pt Instructions:  1. Continue current plan with no evidence of addiction or diversion. 2. Stable on current medication without adverse events. 3. Refilled  buprenorphine HCl (BELBUCA) 300 mcg film, 1 Film by Buccal route every twelve hours  4. Refilled Hydrocodone/ Acetaminophen 10 mg/325 mg tablet  one tablet two times daily prn   5. Continue compound cream, apply 2-3 times daily  6. Discussed risks of addiction, dependency, and opioid induced hyperalgesia. 7. Reviewed with patient benefits of home exercise, and lifestyle changes to assist in self-management of symptoms. 8. Return to clinic in 3 months        Prescription monitoring program reviewed. The patient  should keep track of total Tylenol intake and make sure liver function tests have been checked with primary care physician. Pain medications prescribed with the objective of pain relief and improved physical and psychosocial function in this patient. DISPOSITION  · Counseled patient on proper use of prescribed medications. · Counseled patient about chronic medical conditions and their relationship to anxiety and depression and recommended mental health support as needed. · Reviewed with patient self-help tools, home exercise, and lifestyle changes to assist the patient in self-management of symptoms. · Reviewed with patient the treatment plan, goals of treatment plan, and limitations of treatment plan, to include the potential for side effects from medications and procedures. If side effects occur, it is the responsibility of the patient to inform the clinic so that a change in the treatment plan can be made in a safe manner. The patient is advised that stopping prescribed medication may cause an increase in symptoms and possible medication withdrawal symptoms.  The patient is informed an emergency room evaluation may be necessary if this occurs. Spent 25 minutes with patient today reviewing the treatment plan, goals of treatment plan, and limitations of the treatment plan, to include the potential for side effects from medications and procedures. More than 50% of the visit time was spent counseling the patient. Anup Gama PA-C 3/14/2018        Note: Please excuse any typographical errors. Voice recognition software was used for this note and may cause mistakes.

## 2018-03-14 NOTE — MR AVS SNAPSHOT
[address] [facility] [address] 
[phone] Patient: Heriberto Salas MRN: [id number] :1965 Visit Information Date & Time Provider Department Dept. Phone Encounter #  
 3/14/2018  1:00 PM Vana Holter [facility] [phone] [id number] Upcoming Health Maintenance Date Due Hepatitis C Screening 1965 DTaP/Tdap/Td series (1 - Tdap) 1984 PAP AKA CERVICAL CYTOLOGY 1984 BREAST CANCER SCRN MAMMOGRAM 2013 FOBT Q 1 YEAR AGE 50-75 2013 Influenza Age 5 to Adult 2017 Allergies as of 3/14/2018  Review Complete On: 3/14/2018 By: Chani Roche PA-C Severity Noted Reaction Type Reactions Iodinated Contrast- Oral And Iv Dye High 2013   Systemic Anaphylaxis Iodine    Anaphylaxis Morphine    Nausea and Vomiting Nsaids (Non-steroidal Anti-inflammatory Drug)    Other (comments) Severe edema Current Immunizations  Never Reviewed No immunizations on file. Not reviewed this visit You Were Diagnosed With   
  
 Codes Comments DDD (degenerative disc disease), lumbar    -  Primary ICD-10-CM: M51.36 
ICD-9-CM: 722.52 Lumbar degenerative disc disease     ICD-10-CM: M51.36 
ICD-9-CM: 722.52 Fibromyalgia syndrome     ICD-10-CM: M79.7 ICD-9-CM: 729.1 Coccydynia     ICD-10-CM: M53.3 ICD-9-CM: 724.79 Chronic pain of left knee     ICD-10-CM: M25.562, G89.29 ICD-9-CM: 719.46, 338.29 Chronic bilateral low back pain without sciatica     ICD-10-CM: M54.5, G89.29 ICD-9-CM: 724.2, 338.29 Chronic pelvic pain in female     ICD-10-CM: R10.2, G89.29 ICD-9-CM: 625.9, 338.29 Abdominal pain of multiple sites     ICD-10-CM: R10.9 ICD-9-CM: 789.09 Vitals BP Pulse Temp Resp Height(growth percentile) Weight(growth percentile) (!) 168/97 (BP 1 Location: Right arm, BP Patient Position: Sitting) 80 98.6 °F (37 °C) (Oral) 18 5' 7\" (1.702 m) 315 lb (142.9 kg) BMI OB Status Smoking Status 49.34 kg/m2 Hysterectomy Never Smoker BMI and BSA Data Body Mass Index Body Surface Area  
 49.34 kg/m 2 2.6 m 2 Preferred Pharmacy Pharmacy Name Phone [phone] [address] [facility] [phone] Your Updated Medication List  
  
   
This list is accurate as of 3/14/18  2:17 PM.  Always use your most recent med list.  
  
  
  
  
 albuterol 90 mcg/actuation inhaler Commonly known as:  PROVENTIL HFA, VENTOLIN HFA, PROAIR HFA Take 1 Puff by inhalation. * buprenorphine HCl 300 mcg Film Commonly known as:  BELBUCA  
1 Film by Buccal route every twelve (12) hours. Max Daily Amount: 2 Film. for chronic, severe, refractory pain * buprenorphine HCl 300 mcg Film Commonly known as:  BELBUCA  
1 Film by Buccal route every twelve (12) hours for 90 days. Max Daily Amount: 2 Film. for chronic, severe, refractory pain. 90 day bulk for mail order  
  
 calcium-cholecalciferol (D3) tablet Commonly known as:  CALTRATE 600+D Take 1 Tab by mouth daily. CINNAMON PO Take  by mouth. * CYMBALTA 30 mg capsule Generic drug:  DULoxetine Take 30 mg by mouth three (3) times daily. * CYMBALTA 60 mg capsule Generic drug:  DULoxetine Take 60 mg by mouth daily. diphenhydrAMINE 50 mg tablet Commonly known as:  BENADRYL Take 50 mg by mouth nightly as needed. Flaxseed Oil Oil  
by Does Not Apply route.  
  
 garlic 1 mg Cap Take  by mouth.  
  
 * HYDROcodone-acetaminophen  mg tablet Commonly known as:  Vandana Rummage Take 1 Tab by mouth two (2) times daily as needed for Pain. Max Daily Amount: 2 Tabs. * HYDROcodone-acetaminophen  mg tablet Commonly known as:  Vandana Rummage  
 Take 1 Tab by mouth two (2) times daily as needed for Pain. Max Daily Amount: 2 Tabs. * HYDROcodone-acetaminophen  mg tablet Commonly known as:  Alona Helms Take 1 Tab by mouth two (2) times daily as needed for Pain. Max Daily Amount: 2 Tabs. Start taking on:  4/13/2018  
  
 * HYDROcodone-acetaminophen  mg tablet Commonly known as:  Alona Pierre Take 1 Tab by mouth two (2) times daily as needed for Pain. Max Daily Amount: 2 Tabs. Note dose change Start taking on:  5/12/2018 INDERAL LA 60 mg SR capsule Generic drug:  propranolol LA Take  by mouth daily. levothyroxine 25 mcg tablet Commonly known as:  SYNTHROID Take 0.045 mcg by mouth Daily (before breakfast). lidocaine hcl (pf) 200 mg/mL (20 %) Soln  
by IntraVENous route. LIPITOR PO Take  by mouth.  
  
 metFORMIN 500 mg tablet Commonly known as:  GLUCOPHAGE Take  by mouth two (2) times daily (with meals). naloxone 4 mg/actuation nasal spray Commonly known as:  NARCAN  
1 [place] by IntraNASal route as needed. As needed for opioid respiratory emergency only  
  
 omeprazole 20 mg capsule Commonly known as:  PRILOSEC Take 20 mg by mouth daily. promethazine 25 mg tablet Commonly known as:  PHENERGAN Take 25 mg by mouth every six (6) hours as needed. RELPAX 40 mg tablet Generic drug:  eletriptan Take 40 mg by mouth once as needed. may repeat in 2 hours if necessary VITAMIN D2 50,000 unit capsule Generic drug:  ergocalciferol Take 50,000 Units by mouth.  
  
 vitamin E 400 unit capsule Commonly known as:  [address] Take  by mouth daily. * Notice: This list has 8 medication(s) that are the same as other medications prescribed for you. Read the directions carefully, and ask your doctor or other care provider to review them with you. Prescriptions Printed  Refills  
 buprenorphine HCl (BELBUCA) 300 mcg film 0  
 Si Film by Buccal route every twelve (12) hours for 90 days. Max Daily Amount: 2 Film. for chronic, severe, refractory pain. 90 day bulk for mail order Class: Print Route: Buccal  
 HYDROcodone-acetaminophen (NORCO)  mg tablet 0 Starting on: 2018 Sig: Take 1 Tab by mouth two (2) times daily as needed for Pain. Max Daily Amount: 2 Tabs. Note dose change Class: Print Route: Oral  
 HYDROcodone-acetaminophen (NORCO)  mg tablet 0 Starting on: 2018 Sig: Take 1 Tab by mouth two (2) times daily as needed for Pain. Max Daily Amount: 2 Tabs. Class: Print Route: Oral  
 HYDROcodone-acetaminophen (NORCO)  mg tablet 0 Sig: Take 1 Tab by mouth two (2) times daily as needed for Pain. Max Daily Amount: 2 Tabs. Class: Print Route: Oral  
  
We Performed the Following REFERRAL TO PAIN MANAGEMENT [[id number] Custom] Comments:  
 Please evaluate patient for chronic low back and coccygeal pain due to lumbar neuritis/DDD. She reports that caudal GEOVANNI in the past has provided more than 6 months of excellent relief. Thank you in advance for again seeing this pleasant patient. Referral Information Referral ID Referred By Referred To  
  
 [id number] Oskar Guzman MD   
   [address] [place] [address] Phone: [phone] Fax: [phone] Visits Status Start Date End Date 1 New Request 3/14/18 3/14/19 If your referral has a status of pending review or denied, additional information will be sent to support the outcome of this decision. Introducing [facility] & HEALTH SERVICES! [employer] introduces [employer] patient portal. Now you can access parts of your medical record, email your doctor's office, and request medication refills online. 1. In your internet browser, go to [email or web address] [employer]/Medical Connections 2. Click on the First Time User? Click Here link in the Sign In box.  You will see the New Member Sign Up page. 3. Enter your [employer] Access Code exactly as it appears below. You will not need to use this code after youve completed the sign-up process. If you do not sign up before the expiration date, you must request a new code. · [employer] Access Code: [facility] PLZ-ER Expires: 6/12/2018  2:17 PM 
 
4. Enter the last four digits of your Social Security Number (xxxx) and Date of Birth (mm/dd/yyyy) as indicated and click Submit. You will be taken to the next sign-up page. 5. Create a [employer] ID. This will be your [employer] login ID and cannot be changed, so think of one that is secure and easy to remember. 6. Create a [employer] password. You can change your password at any time. 7. Enter your Password Reset Question and Answer. This can be used at a later time if you forget your password. 8. Enter your e-mail address. You will receive e-mail notification when new information is available in [address]. 9. Click Sign Up. You can now view and download portions of your medical record. 10. Click the Download Summary menu link to download a portable copy of your medical information. If you have questions, please visit the Frequently Asked Questions section of the [employer] website. Remember, [employer] is NOT to be used for urgent needs. For medical emergencies, dial 911. Now available from your iPhone and Android! Please provide this summary of care documentation to your next provider. Your primary care clinician is listed as Primitivo Shah. If you have any questions after today's visit, please call [phone].

## 2018-05-30 ENCOUNTER — OFFICE VISIT (OUTPATIENT)
Dept: PAIN MANAGEMENT | Age: 53
End: 2018-05-30

## 2018-05-30 VITALS
DIASTOLIC BLOOD PRESSURE: 90 MMHG | SYSTOLIC BLOOD PRESSURE: 143 MMHG | WEIGHT: 293 LBS | HEART RATE: 108 BPM | TEMPERATURE: 98.6 F | BODY MASS INDEX: 45.99 KG/M2 | HEIGHT: 67 IN | RESPIRATION RATE: 18 BRPM

## 2018-05-30 DIAGNOSIS — M51.36 LUMBAR DEGENERATIVE DISC DISEASE: ICD-10-CM

## 2018-05-30 DIAGNOSIS — M53.3 COCCYDYNIA: ICD-10-CM

## 2018-05-30 DIAGNOSIS — G24.3 ISOLATED CERVICAL DYSTONIA: ICD-10-CM

## 2018-05-30 DIAGNOSIS — M54.50 CHRONIC BILATERAL LOW BACK PAIN WITHOUT SCIATICA: ICD-10-CM

## 2018-05-30 DIAGNOSIS — R10.9 ABDOMINAL PAIN OF MULTIPLE SITES: ICD-10-CM

## 2018-05-30 DIAGNOSIS — M79.7 FIBROMYALGIA SYNDROME: ICD-10-CM

## 2018-05-30 DIAGNOSIS — G89.29 CHRONIC BILATERAL LOW BACK PAIN WITHOUT SCIATICA: ICD-10-CM

## 2018-05-30 DIAGNOSIS — M54.40 LUMBAGO OF LUMBOSACARAL REGION WITH SCIATICA: ICD-10-CM

## 2018-05-30 DIAGNOSIS — Z79.899 ENCOUNTER FOR LONG-TERM (CURRENT) USE OF HIGH-RISK MEDICATION: ICD-10-CM

## 2018-05-30 DIAGNOSIS — M51.36 DDD (DEGENERATIVE DISC DISEASE), LUMBAR: Primary | ICD-10-CM

## 2018-05-30 LAB
ALCOHOL UR POC: NORMAL
AMPHETAMINES UR POC: NEGATIVE
BARBITURATES UR POC: NORMAL
BENZODIAZEPINES UR POC: NEGATIVE
BUPRENORPHINE UR POC: NORMAL
CANNABINOIDS UR POC: NEGATIVE
CARISOPRODOL UR POC: NORMAL
COCAINE UR POC: NEGATIVE
FENTANYL UR POC: NORMAL
MDMA/ECSTASY UR POC: NORMAL
METHADONE UR POC: NEGATIVE
METHAMPHETAMINE UR POC: NORMAL
METHYLPHENIDATE UR POC: NORMAL
OPIATES UR POC: NORMAL
OXYCODONE UR POC: NORMAL
PHENCYCLIDINE UR POC: NORMAL
PROPOXYPHENE UR POC: NORMAL
TRAMADOL UR POC: NORMAL
TRICYCLICS UR POC: NORMAL

## 2018-05-30 RX ORDER — HYDROCODONE BITARTRATE AND ACETAMINOPHEN 10; 325 MG/1; MG/1
1 TABLET ORAL
Qty: 60 TAB | Refills: 0 | Status: SHIPPED | OUTPATIENT
Start: 2018-06-01 | End: 2018-08-24

## 2018-05-30 RX ORDER — BUPRENORPHINE HCL 300 MCG
300 FILM, MEDICATED (EA) BUCCAL EVERY 12 HOURS
Qty: 60 FILM | Refills: 0 | Status: SHIPPED | OUTPATIENT
Start: 2018-07-10 | End: 2018-08-09

## 2018-05-30 NOTE — PROGRESS NOTES
Nursing Notes    Patient presents to the office today in follow-up. Patient rates her pain at 7/10 on the numerical pain scale. Reviewed medications with counts as follows:    Rx Date filled Qty Dispensed Pill Count Last Dose Short   Norco  mg 04/11/18 60 5 This a.m no   Belbuca 300 mcg 04/11/18 160 85 This a.m no                                POC UDS was performed in office today    Any new labs or imaging since last appointment? NO    Have you been to an emergency room (ER) or urgent care clinic since your last visit? NO            Have you been hospitalized since your last visit? NO     If yes, where, when, and reason for visit? Have you seen or consulted any other health care providers outside of the [address] [place] [address]  since your last visit? NO     If yes, where, when, and reason for visit? Ms. Mahesh Siu has a reminder for a \"due or due soon\" health maintenance. I have asked that she contact her primary care provider for follow-up on this health maintenance.

## 2018-05-30 NOTE — PROGRESS NOTES
HISTORY OF PRESENT ILLNESS  Ayden Esteves is a 45 y.o. female    Ms. Иван Lam presents today for follow up of chronic widespread pain due to fibromyalgia and lumbar degenerative disc disease. She also suffers with bilateral knee pain due to osteoarthritis as well as periodic migraines.       This patient denies any significant changes in her chronic pain since last visit. She reports having double vision about 5 weeks ago. She has seen the ophthalmologist and he ordered MRIs for her brain and eyes. She will meet with the doctor for results in 1 week. In addition, she tripped over a plastic box in the house and now has a large bruise on right lower lateral leg. We discussed her elevated blood pressure today. She reports she will speak with her primary care provider at next visit. We discussed her current condition and medications in detail today. She tolerates medications without side effects. Carissa Cespedes reports no change in sleep or constipation.  Patient understands current practice transition and taper plan in future. Patient is planning on transferring her continued pain management care to another practice. She will sign a medical release form today. I will provide 1 month transition prescription. We will assist patient with any medical records transfer as needed. Current MME dose as of today:20.6    Current medication management consists of: Belbuca 300 mcg every 12 hours, Hydrocodone/ Acetaminophen 10 mg/325 mg tablet  one tablet two times daily prn, compound cream use 3-4 times daily as needed for joint pain  Medications are helping with pain control and quality of life. Her pain is 4/10 with medication and 10/10 without.  Pt describes pain a radiating electrical ripple, stabbing, and burning. Aggravating factors include standing, sitting, and walking. Relieved with rest, medication, and avoiding painful activities.  The patient reports 40% relief with current medications.        Pain Meds and Quality Of Life have been reviewed. Nonpharmacologic therapy and non-opioid pharmacologic therapy were considered. If opioid therapy is prescribed, this is only if the expected benefits are anticipated to outweigh risks. She  is otherwise doing well with no other complaints today. She denies any adverse events including nausea, vomiting, dizziness, increased constipation, hallucinations, or seizures. The patient reports functional improvement and QOL with pain medication. Vitals:    05/30/18 1311 05/30/18 1312   BP: (!) 143/104 143/90   Pulse: (!) 110 (!) 108   Resp: 18    Temp: 98.6 °F (37 °C)    TempSrc: Oral    Weight: 142.9 kg (315 lb)    Height: 5' 7\" (1.702 m)    PainSc:   6    PainLoc: Back          Allergies   Allergen Reactions    Iodinated Contrast- Oral And Iv Dye Anaphylaxis    Iodine Anaphylaxis    Morphine Nausea and Vomiting    Nsaids (Non-Steroidal Anti-Inflammatory Drug) Other (comments)     Severe edema       History reviewed. No pertinent surgical history. ROS   Constitutional: Positive for malaise/fatigue. Negative for chills, fever and weight loss. HENT: Positive for congestion. Negative for ear discharge, ear pain, hearing loss, nosebleeds, sinus pain and sore throat.    Eyes: Negative for blurred vision, double vision and pain. Respiratory: Negative for cough and shortness of breath.    Cardiovascular: Negative for chest pain and palpitations. Gastrointestinal: Positive for heartburn. Negative for constipation, diarrhea, nausea and vomiting. Musculoskeletal: Positive for back pain, joint pain, myalgias and neck pain. Negative for falls. Skin: Negative for itching and rash. Neurological: Negative for dizziness, tingling, tremors, seizures, loss of consciousness, weakness and headaches. Psychiatric/Behavioral: Positive for depression. Negative for suicidal ideas.  The patient is nervous/anxious and has insomnia.      Physical Exam   Constitutional: She is oriented to person, place, and time and well-developed, well-nourished, and in no distress. She appears healthy.  Non-toxic appearance. No distress. Obese appears anxious   Eyes: Right eye exhibits no discharge. Left eye exhibits no discharge. Neck: Neck supple. Pulmonary/Chest: Effort normal.   Musculoskeletal: She exhibits tenderness.        Right hip: She exhibits decreased strength and tenderness.        Lumbar back: She exhibits tenderness, bony tenderness, pain and spasm.        Right upper leg: She exhibits tenderness. Neurological: She is alert and oriented to person, place, and time. Skin: Skin is warm. She is not diaphoretic. Psychiatric: Affect normal. Her mood appears anxious. Nursing note and vitals reviewed       ASSESSMENT:    1. DDD (degenerative disc disease), lumbar    2. Encounter for long-term (current) use of high-risk medication    3. Lumbago of lumbosacaral region with sciatica    4. Isolated cervical dystonia    5. Lumbar degenerative disc disease    6. Abdominal pain of multiple sites    7. Chronic bilateral low back pain without sciatica    8. Coccydynia    9. Fibromyalgia syndrome          COMM:   [address] [facility] Program was reviewed which does not demonstrate aberrancies and/or inconsistencies with regard to the historical prescribing of controlled medications to this patient by other providers. Medications were brought to visit today. Pill count was appropriate. When possible, non-drug therapy for chronic pain should be used as a first-line treatment. Physical therapy exercise regimens, chiropractic manipulation, meditation relaxation techniques, cognitive behavior therapy, acupuncture, yoga, Ajay Chi,  transcutaneous electrical nerve stimulation (TENS), and application of moist heat can help alleviate pain .      Explained that realistic expectations and goals with chronic pain management are to maximize function and minimize pain with the understanding that limitations will exist both in the extent of relief that she may achieve, as well as thresholds of mg strengths that we will not exceed. Our role is to help the patient better cope with chronic pain utilizng a multimodal approach. The patients condition and plan were discussed. All questions were answered. The patient agrees with the plan. PLAN / Pt Instructions:  1. Continue current plan with no evidence of addiction or diversion. 2. Stable on current medication without adverse events. 3. Refilled  buprenorphine HCl (BELBUCA) 300 mcg film, 1 Film by Buccal route every twelve hours  4. Refilled Hydrocodone/ Acetaminophen 10 mg/325 mg tablet  one tablet two times daily prn   5. Continue compound cream, apply 2-3 times daily  6. Discussed risks of addiction, dependency, and opioid induced hyperalgesia. 7. Reviewed with patient benefits of home exercise, and lifestyle changes to assist in self-management of symptoms. 8. Patient plans on moving her pain management care to another provider. 9. She will tentatively schedule for 3 month follow-up. We will assist with medical records transfer as needed. Prescription monitoring program reviewed. The patient  should keep track of total Tylenol intake and make sure liver function tests have been checked with primary care physician. POC UDS today. Pain medications prescribed with the objective of pain relief and improved physical and psychosocial function in this patient. DISPOSITION  · Counseled patient on proper use of prescribed medications. · Counseled patient about chronic medical conditions and their relationship to anxiety and depression and recommended mental health support as needed. · Reviewed with patient self-help tools, home exercise, and lifestyle changes to assist the patient in self-management of symptoms.   · Reviewed with patient the treatment plan, goals of treatment plan, and limitations of treatment plan, to include the potential for side effects from medications and procedures. If side effects occur, it is the responsibility of the patient to inform the clinic so that a change in the treatment plan can be made in a safe manner. The patient is advised that stopping prescribed medication may cause an increase in symptoms and possible medication withdrawal symptoms. The patient is informed an emergency room evaluation may be necessary if this occurs. Spent 25 minutes with patient today reviewing the treatment plan, goals of treatment plan, and limitations of the treatment plan, to include the potential for side effects from medications and procedures. More than 50% of the visit time was spent counseling the patient. David Cox PA-C 5/30/2018        Note: Please excuse any typographical errors. Voice recognition software was used for this note and may cause mistakes.

## 2018-05-30 NOTE — MR AVS SNAPSHOT
[address] [address] 
[phone] Patient: Marcella Gold MRN: [id number] :1965 Visit Information Date & Time Provider Department Dept. Phone Encounter #  
 2018 12:40 PM [place] [facility] [phone] Upcoming Health Maintenance Date Due Hepatitis C Screening 1965 DTaP/Tdap/Td series (1 - Tdap) 1984 PAP AKA CERVICAL CYTOLOGY 1984 BREAST CANCER SCRN MAMMOGRAM 2013 FOBT Q 1 YEAR AGE 50-75 2013 MEDICARE YEARLY EXAM 3/14/2018 Influenza Age 5 to Adult 2018 Allergies as of 2018  Review Complete On: 2018 By: Sandi Poole PA-C Severity Noted Reaction Type Reactions Iodinated Contrast- Oral And Iv Dye High 2013   Systemic Anaphylaxis Iodine    Anaphylaxis Morphine    Nausea and Vomiting Nsaids (Non-steroidal Anti-inflammatory Drug)    Other (comments) Severe edema Current Immunizations  Never Reviewed No immunizations on file. Not reviewed this visit You Were Diagnosed With   
  
 Codes Comments DDD (degenerative disc disease), lumbar    -  Primary ICD-10-CM: M51.36 
ICD-9-CM: 722.52 Encounter for long-term (current) use of high-risk medication     ICD-10-CM: Z79.899 ICD-9-CM: V58.69 Lumbago of lumbosacaral region with sciatica     ICD-10-CM: M54.40 ICD-9-CM: 724.2, 724.3 Isolated cervical dystonia     ICD-10-CM: G24.3 ICD-9-CM: 333.83 Lumbar degenerative disc disease     ICD-10-CM: M51.36 
ICD-9-CM: 722.52 Abdominal pain of multiple sites     ICD-10-CM: R10.9 ICD-9-CM: 789.09 Chronic bilateral low back pain without sciatica     ICD-10-CM: M54.5, G89.29 ICD-9-CM: 724.2, 338.29 Coccydynia     ICD-10-CM: M53.3 ICD-9-CM: 724.79 Fibromyalgia syndrome     ICD-10-CM: M79.7 ICD-9-CM: 729.1 Vitals BP Pulse Temp Resp Height(growth percentile) Weight(growth percentile) 143/90 (BP 1 Location: Right arm, BP Patient Position: Sitting) (!) 108 98.6 °F (37 °C) (Oral) 18 5' 7\" (1.702 m) 315 lb (142.9 kg) BMI OB Status Smoking Status 49.34 kg/m2 Hysterectomy Never Smoker Vitals History BMI and BSA Data Body Mass Index Body Surface Area  
 49.34 kg/m 2 2.6 m 2 Preferred Pharmacy Pharmacy Name Phone [phone], [phone] [facility]ia [facility] [phone] Your Updated Medication List  
  
   
This list is accurate as of 5/30/18  2:14 PM.  Always use your most recent med list.  
  
  
  
  
 albuterol 90 mcg/actuation inhaler Commonly known as:  PROVENTIL HFA, VENTOLIN HFA, PROAIR HFA Take 1 Puff by inhalation. * buprenorphine HCl 300 mcg Film buccal film Commonly known as:  BELBUCA  
1 Film by Buccal route every twelve (12) hours. Max Daily Amount: 2 Film. for chronic, severe, refractory pain * buprenorphine HCl 300 mcg Film buccal film Commonly known as:  BELBUCA  
1 Film by Buccal route every twelve (12) hours for 90 days. Max Daily Amount: 2 Film. for chronic, severe, refractory pain. 90 day bulk for mail order * buprenorphine HCl 300 mcg Film buccal film Commonly known as:  BELBUCA  
1 Film by Buccal route every twelve (12) hours for 30 days. Max Daily Amount: 2 Film. for chronic, severe, refractory pain. 90 day bulk for mail order  Indications: Chronic Pain Start taking on:  7/10/2018  
  
 calcium-cholecalciferol (D3) tablet Commonly known as:  CALTRATE 600+D Take 1 Tab by mouth daily. CINNAMON PO Take  by mouth. * CYMBALTA 30 mg capsule Generic drug:  DULoxetine Take 30 mg by mouth three (3) times daily. * CYMBALTA 60 mg capsule Generic drug:  DULoxetine Take 60 mg by mouth daily. diphenhydrAMINE 50 mg tablet Commonly known as:  BENADRYL Take 50 mg by mouth nightly as needed. Flaxseed Oil Oil  
by Does Not Apply route.  
  
 garlic 1 mg Cap Take  by mouth.  
  
 * HYDROcodone-acetaminophen  mg tablet Commonly known as:  Phil Collar Take 1 Tab by mouth two (2) times daily as needed for Pain. Max Daily Amount: 2 Tabs. * HYDROcodone-acetaminophen  mg tablet Commonly known as:  Phil Collar Take 1 Tab by mouth two (2) times daily as needed for Pain. Max Daily Amount: 2 Tabs. * HYDROcodone-acetaminophen  mg tablet Commonly known as:  Phil Collar Take 1 Tab by mouth two (2) times daily as needed for Pain. Max Daily Amount: 2 Tabs. * HYDROcodone-acetaminophen  mg tablet Commonly known as:  [place] Collar Take 1 Tab by mouth two (2) times daily as needed for Pain. Max Daily Amount: 2 Tabs. Note dose change * HYDROcodone-acetaminophen  mg tablet Commonly known as:  [place] Collar Take 1 Tab by mouth two (2) times daily as needed for Pain. Max Daily Amount: 2 Tabs. Note dose change Start taking on:  6/1/2018 INDERAL LA 60 mg SR capsule Generic drug:  propranolol LA Take  by mouth daily. levothyroxine 25 mcg tablet Commonly known as:  SYNTHROID Take 0.045 mcg by mouth Daily (before breakfast). lidocaine hcl (pf) 200 mg/mL (20 %) Soln  
by IntraVENous route. LIPITOR PO Take  by mouth.  
  
 metFORMIN 500 mg tablet Commonly known as:  GLUCOPHAGE Take  by mouth two (2) times daily (with meals). naloxone 4 mg/actuation nasal spray Commonly known as:  NARCAN  
1 [place] by IntraNASal route as needed. As needed for opioid respiratory emergency only  
  
 omeprazole 20 mg capsule Commonly known as:  PRILOSEC Take 20 mg by mouth daily. promethazine 25 mg tablet Commonly known as:  PHENERGAN Take 25 mg by mouth every six (6) hours as needed. RELPAX 40 mg tablet Generic drug:  eletriptan Take 40 mg by mouth once as needed. may repeat in 2 hours if necessary VITAMIN D2 50,000 unit capsule Generic drug:  ergocalciferol Take 50,000 Units by mouth.  
  
 vitamin E 400 unit capsule Commonly known as:  [address] Take  by mouth daily. * Notice: This list has 10 medication(s) that are the same as other medications prescribed for you. Read the directions carefully, and ask your doctor or other care provider to review them with you. Prescriptions Printed Refills  
 buprenorphine HCl (BELBUCA) 300 mcg film buccal film 0 Starting on: 7/10/2018 Si Film by Buccal route every twelve (12) hours for 30 days. Max Daily Amount: 2 Film. for chronic, severe, refractory pain. 90 day bulk for mail order  Indications: Chronic Pain Class: Print Route: Buccal  
 HYDROcodone-acetaminophen (NORCO)  mg tablet 0 Starting on: 2018 Sig: Take 1 Tab by mouth two (2) times daily as needed for Pain. Max Daily Amount: 2 Tabs. Note dose change Class: Print Route: Oral  
  
We Performed the Following AMB POC DRUG SCREEN () [ [facility]] DRUG SCREEN [[id number] Custom] REFERRAL TO PAIN MANAGEMENT [PRH326 Custom] Comments:  
 Please evaluate patient for chronic pain syndrome Referral Information Referral ID Referred By Referred To  
  
 [id number] Violeta Moses Not Available Visits Status Start Date End Date 1 New Request 18 If your referral has a status of pending review or denied, additional information will be sent to support the outcome of this decision. Introducing [facility]! [facility] introduces Stack Exchange patient portal. Now you can access parts of your medical record, email your doctor's office, and request medication refills online. 1. In your internet browser, go to [email or web address] 2. Click on the First Time User? Click Here link in the Sign In box.  You will see the New Member Sign Up page. 3. Enter your Zillion[employer] Access Code exactly as it appears below. You will not need to use this code after youve completed the sign-up process. If you do not sign up before the expiration date, you must request a new code. · [employer] Access Code: [facility] PLZ-ER Expires: 6/12/2018  2:17 PM 
 
4. Enter the last four digits of your Social Security Number (xxxx) and Date of Birth (mm/dd/yyyy) as indicated and click Submit. You will be taken to the next sign-up page. 5. Create a Zillion[employer]t ID. This will be your [employer] login ID and cannot be changed, so think of one that is secure and easy to remember. 6. Create a [employer] password. You can change your password at any time. 7. Enter your Password Reset Question and Answer. This can be used at a later time if you forget your password. 8. Enter your e-mail address. You will receive e-mail notification when new information is available in [address]. 9. Click Sign Up. You can now view and download portions of your medical record. 10. Click the Download Summary menu link to download a portable copy of your medical information. If you have questions, please visit the Frequently Asked Questions section of the [employer] website. Remember, [employer] is NOT to be used for urgent needs. For medical emergencies, dial 911. Now available from your iPhone and Android! Please provide this summary of care documentation to your next provider. Your primary care clinician is listed as Michael Camp. If you have any questions after today's visit, please call [phone].

## 2018-08-24 ENCOUNTER — TELEPHONE (OUTPATIENT)
Dept: PAIN MANAGEMENT | Age: 53
End: 2018-08-24

## 2018-08-24 DIAGNOSIS — G89.4 CHRONIC PAIN DISORDER: Primary | ICD-10-CM

## 2018-08-24 RX ORDER — BUPRENORPHINE HCL 300 MCG
300 FILM, MEDICATED (EA) BUCCAL EVERY 12 HOURS
Qty: 60 FILM | Refills: 0 | Status: SHIPPED | OUTPATIENT
Start: 2018-08-24 | End: 2018-09-23

## 2018-08-24 NOTE — TELEPHONE ENCOUNTER
The pt called the office in reference to her next OV and her medication. I called the pt and was not able to reach her. A message was left for the pt and asked her to contact the office at her earliest convenience. The number to the nurse triage line was provided. Pt has an upcoming appt 08/29/18.

## 2018-08-24 NOTE — TELEPHONE ENCOUNTER
Kylee Salguero has called requesting a refill of their controlled medication, belbucca and norco, for the management of her chronic back and knee pain. Last office visit date: 05/30/18    Date last  was pulled and reviewed : 08/24/18, last fill date for belbucca was 07/10/18    Was the patient compliant when the above report was pulled? yes    Analgesia: pt reports a 70% pain relief with her current opioid medication regimen    Aberrancies: none noted    ADL's: pt reports being able to perform her daily dutoes while taking her medication. Adverse Reaction: none reported from the pt at this time. Provider's last note and plan of care reviewed? yes  Request forwarded to provider for review.

## 2018-08-29 ENCOUNTER — OFFICE VISIT (OUTPATIENT)
Dept: PAIN MANAGEMENT | Age: 53
End: 2018-08-29

## 2018-08-29 VITALS
HEART RATE: 96 BPM | HEIGHT: 67 IN | BODY MASS INDEX: 45.99 KG/M2 | SYSTOLIC BLOOD PRESSURE: 118 MMHG | TEMPERATURE: 98.7 F | WEIGHT: 293 LBS | DIASTOLIC BLOOD PRESSURE: 88 MMHG | RESPIRATION RATE: 18 BRPM

## 2018-08-29 DIAGNOSIS — M54.5 CHRONIC BILATERAL LOW BACK PAIN, WITH SCIATICA PRESENCE UNSPECIFIED: Primary | ICD-10-CM

## 2018-08-29 DIAGNOSIS — G89.29 CHRONIC PAIN OF LEFT KNEE: ICD-10-CM

## 2018-08-29 DIAGNOSIS — G89.4 CHRONIC PAIN SYNDROME: ICD-10-CM

## 2018-08-29 DIAGNOSIS — G89.29 CHRONIC BILATERAL LOW BACK PAIN, WITH SCIATICA PRESENCE UNSPECIFIED: Primary | ICD-10-CM

## 2018-08-29 DIAGNOSIS — M70.52 PES ANSERINUS BURSITIS OF LEFT KNEE: ICD-10-CM

## 2018-08-29 DIAGNOSIS — Z79.899 ENCOUNTER FOR LONG-TERM (CURRENT) USE OF HIGH-RISK MEDICATION: ICD-10-CM

## 2018-08-29 DIAGNOSIS — M46.1 SACROILIITIS (HCC): ICD-10-CM

## 2018-08-29 DIAGNOSIS — M70.62 TROCHANTERIC BURSITIS OF LEFT HIP: ICD-10-CM

## 2018-08-29 DIAGNOSIS — M25.562 CHRONIC PAIN OF LEFT KNEE: ICD-10-CM

## 2018-08-29 RX ORDER — HYDROCODONE BITARTRATE AND ACETAMINOPHEN 7.5; 325 MG/1; MG/1
1 TABLET ORAL
Qty: 60 TAB | Refills: 0 | Status: SHIPPED | OUTPATIENT
Start: 2018-08-29 | End: 2018-09-28

## 2018-08-29 NOTE — PATIENT INSTRUCTIONS
Learning About Sleeping Well What does sleeping well mean? Sleeping well means getting enough sleep. How much sleep is enough varies among people. The number of hours you sleep is not as important as how you feel when you wake up. If you do not feel refreshed, you probably need more sleep. Another sign of not getting enough sleep is feeling tired during the day. The average total nightly sleep time is 7½ to 8 hours. Healthy adults may need a little more or a little less than this. Why is getting enough sleep important? Getting enough quality sleep is a basic part of good health. When your sleep suffers, your mood and your thoughts can suffer too. You may find yourself feeling more grumpy or stressed. Not getting enough sleep also can lead to serious problems, including injury, accidents, anxiety, and depression. What might cause poor sleeping? Many things can cause sleep problems, including: · Stress. Stress can be caused by fear about a single event, such as giving a speech. Or you may have ongoing stress, such as worry about work or school. · Depression, anxiety, and other mental or emotional conditions. · Changes in your sleep habits or surroundings. This includes changes that happen where you sleep, such as noise, light, or sleeping in a different bed. It also includes changes in your sleep pattern, such as having jet lag or working a late shift. · Health problems, such as pain, breathing problems, and restless legs syndrome. · Lack of regular exercise. How can you help yourself? Here are some tips that may help you sleep more soundly and wake up feeling more refreshed. Your sleeping area · Use your bedroom only for sleeping and sex. A bit of light reading may help you fall asleep. But if it doesn't, do your reading elsewhere in the house. Don't watch TV in bed. · Be sure your bed is big enough to stretch out comfortably, especially if you have a sleep partner. · Keep your bedroom quiet, dark, and cool. Use curtains, blinds, or a sleep mask to block out light. To block out noise, use earplugs, soothing music, or a \"white noise\" machine. Your evening and bedtime routine · Create a relaxing bedtime routine. You might want to take a warm shower or bath, listen to soothing music, or drink a cup of noncaffeinated tea. · Go to bed at the same time every night. And get up at the same time every morning, even if you feel tired. What to avoid · Limit caffeine (coffee, tea, caffeinated sodas) during the day, and don't have any for at least 4 to 6 hours before bedtime. · Don't drink alcohol before bedtime. Alcohol can cause you to wake up more often during the night. · Don't smoke or use tobacco, especially in the evening. Nicotine can keep you awake. · Don't take naps during the day, especially close to bedtime. · Don't lie in bed awake for too long. If you can't fall asleep, or if you wake up in the middle of the night and can't get back to sleep within 15 minutes or so, get out of bed and go to another room until you feel sleepy. · Don't take medicine right before bed that may keep you awake or make you feel hyper or energized. Your doctor can tell you if your medicine may do this and if you can take it earlier in the day. If you can't sleep · Imagine yourself in a peaceful, pleasant scene. Focus on the details and feelings of being in a place that is relaxing. · Get up and do a quiet or boring activity until you feel sleepy. · Don't drink any liquids after 6 p.m. if you wake up often because you have to go to the bathroom. Where can you learn more? Go to http://rossana-christelle.info/. Enter A790 in the search box to learn more about \"Learning About Sleeping Well. \" Current as of: December 7, 2017 Content Version: 11.7 © 9475-5549 [employer].  Care instructions adapted under license by [address] (which disclaims liability or warranty for this information). If you have questions about a medical condition or this instruction, always ask your healthcare professional. [address] any warranty or liability for your use of this information. Safe Use of Opioid Pain Medicine: Care Instructions Your Care Instructions Pain is your body's way of warning you that something is wrong. Pain feels different for everybody. Only you can describe your pain. A doctor can suggest or prescribe many types of medicines for pain. These range from over-the-counter medicines like acetaminophen (Tylenol) to powerful medicines called opioids. Examples of opioids are fentanyl, hydrocodone, morphine, and oxycodone. Heroin is an illegal opioid Opioids are strong medicines. They can help you manage pain when you use them the right way. But if you misuse them, they can cause serious harm and even death. For these reasons, doctors are very careful about how they prescribe opioids. If you decide to take opioids, here are some things to remember. · Keep your doctor informed. You can get addicted to opioids. The risk is higher if you have a history of substance use. Your doctor will monitor you closely for signs of misuse and addiction and to figure out when you no longer need to take opioids. · Make a treatment plan. The goal of your plan is to be able to function and do the things you need to do, even if you still have some pain. You might be able to manage your pain with other non-opioid options like physical therapy, relaxation, or over-the-counter pain medicines. · Be aware of the side effects. Opioids can cause serious side effects, such as constipation, dry mouth, and nausea. And over time, you may need a higher dose to get pain relief. This is called tolerance. Your body also gets used to opioids. This is called physical dependence.  If you suddenly stop taking them, you may have withdrawal symptoms. The doctor carefully considered what pain medicine is right for you. You may not have received opioids if your doctor was concerned about drug interactions or your safety, or if he or she had other concerns. It is best to have one doctor or clinic treat your pain. This way you will get the pain medicine that will help you the most. And a doctor will be able to watch for any problems that the medicine might cause. The doctor has checked you carefully, but problems can develop later. If you notice any problems or new symptoms,  get medical treatment right away. Follow-up care is a key part of your treatment and safety. Be sure to make and go to all appointments, and call your doctor if you are having problems. It's also a good idea to know your test results and keep a list of the medicines you take. How can you care for yourself at home? · If you need to take opioids to manage your pain, remember these safety tips. ¨ Follow directions carefully. It's easy to misuse opioids if you take a dose other than what's prescribed by your doctor. This can lead to overdose and even death. Even sharing them with someone they weren't meant for is misuse. ¨ Be cautious. Opioids may affect your judgment and decision making. Do not drive or operate machinery until you can think clearly. Talk with your doctor about when it is safe to drive. ¨ Reduce the risk of drug interactions. Opioids can be dangerous if you take them with alcohol or with certain drugs like sleeping pills and muscle relaxers. Make sure your doctor knows about all the other medicines you take, including over-the-counter medicines. Don't start any new medicines before you talk to your doctor or pharmacist. 
Anca Asif Keep others safe. Store opioids in a safe and secure place. Make sure that pets, children, friends, and family can't get to them.  When you're done using opioids, make sure to properly dispose of them. You can either use a community drug take-back program or your drugstore's mail-back program. If one of these programs isn't available, you can flush opioid skin patches and unused opioid pills down the toilet. ¨ Reduce the risk of overdose. Misuse of opioids can be very dangerous. Protect yourself by asking your doctor about a naloxone rescue kit. It can help you-and even save your life-if you take too much of an opioid. · Try other ways to reduce pain. ¨ Relax, and reduce stress. Relaxation techniques such as deep breathing or meditation can help. ¨ Keep moving. Gentle, daily exercise can help reduce pain over the long run. Try low- or no-impact exercises such as walking, swimming, and stationary biking. Do stretches to stay flexible. ¨ Try heat, cold packs, and massage. ¨ Get enough sleep. Pain can make you tired and drain your energy. Talk with your doctor if you have trouble sleeping because of pain. ¨ Think positive. Your thoughts can affect your pain level. Do things that you enjoy to distract yourself when you have pain instead of focusing on the pain. See a movie, read a book, listen to music, or spend time with a friend. · If you are not taking a prescription pain medicine, ask your doctor if you can take an over-the-counter medicine. When should you call for help? Call your doctor now or seek immediate medical care if: 
  · You have a new kind of pain.  
  · You have new symptoms, such as a fever or rash, along with the pain.  
 Watch closely for changes in your health, and be sure to contact your doctor if: 
  · You think you might be using too much pain medicine, and you need help to use less or stop.  
  · Your pain gets worse.  
  · You would like a referral to a doctor or clinic that specializes in pain management. Where can you learn more? Go to http://rossana-christelle.info/. Enter R108 in the search box to learn more about \"Safe Use of Opioid Pain Medicine: Care Instructions. \" Current as of: September 10, 2017 Content Version: 11.7 © 1548-9030 [employer]. Care instructions adapted under license by [employer] (which disclaims liability or warranty for this information). If you have questions about a medical condition or this instruction, always ask your healthcare professional. [address] [address] any warranty or liability for your use of this information.

## 2018-08-29 NOTE — PROGRESS NOTES
Nursing Notes Patient presents to the office today in follow-up. Patient rates her pain at 5/10 on the numerical pain scale. Reviewed medications with counts as follows:   
Rx Date filled Qty Dispensed Pill Count Last Dose Short Norco  mg 06/04/18 60 0 8/23/18 no Belbuca 300 mg 07/17/18 180 0 8/22/18 no  
       
       
          
POC UDS was not performed in office today Any new labs or imaging since last appointment? NO Have you been to an emergency room (ER) or urgent care clinic since your last visit? NO Have you been hospitalized since your last visit? NO If yes, where, when, and reason for visit? Have you seen or consulted any other health care providers outside of the [address] [place] [address]  since your last visit? NO If yes, where, when, and reason for visit? Ms. Trudi Nunes has a reminder for a \"due or due soon\" health maintenance. I have asked that she contact her primary care provider for follow-up on this health maintenance. Patient stated that she does have Narcan. PHQ over the last two weeks 8/29/2018 PHQ Not Done Active Diagnosis of Depression or Bipolar Disorder Little interest or pleasure in doing things - Feeling down, depressed, irritable, or hopeless - Total Score PHQ 2 -

## 2018-08-29 NOTE — PROGRESS NOTES
Referral date before 10/31/11, source pain management and for back and joint pain. HPI: 
Derik Chacon is a 52 y.o. female here for f/u visit for ongoing evaluation of chronic low back and joint pain. Pt was last seen here on 5/30/18. Pt denies interval changes on the character or distribution of pain. Pain is located low back pain with radiating down right leg,left knee and silvia hip pain. The pain is described as aching, burning, stabbing. Pain at its best is 2/10. Pain at its worse is 10/10. The pain is worsened by positional, bending, turning, twisting. Symptoms are relieved by cold, heat, laying down, hydrocodone, compound cream, oil compound roll on,epidrual injections, hot tub,ice packs, stretching, aquatic therapy exercises, biofreeze. Pt had hx tailbone broken in the 80's. Pt looking into transfering care with pain management. Social History Social History  Marital status:  Spouse name: N/A  
 Number of children: N/A  
 Years of education: N/A Occupational History  Not on file. Social History Main Topics  Smoking status: Never Smoker  Smokeless tobacco: Never Used  Alcohol use No  
 Drug use: No  
 Sexual activity: Yes Birth control/ protection: Surgical  
   Comment: Hysterectomy Other Topics Concern  Not on file Social History Narrative History reviewed. No pertinent family history. Allergies Allergen Reactions  Iodinated Contrast- Oral And Iv Dye Anaphylaxis  Iodine Anaphylaxis  Morphine Nausea and Vomiting  Nsaids (Non-Steroidal Anti-Inflammatory Drug) Other (comments) Severe edema Past Medical History:  
Diagnosis Date  Degeneration of lumbar intervertebral disc  Depression  Diabetes ([facility] [address]  Dyslipidemia  Esophageal reflux  Fibromyalgia  GERD (gastroesophageal reflux disease)  Hypercholesteremia  Hyperlipidemia  Lumbago  Migraines  Morbid obesity ([address]  Osteoarthritis  Thyroid disease  Vitamin D deficiency History reviewed. No pertinent surgical history. Current Outpatient Prescriptions on File Prior to Visit Medication Sig  buprenorphine HCl (BELBUCA) 300 mcg film buccal film 1 Film by Buccal route every twelve (12) hours for 30 days. Max Daily Amount: 2 Film. for chronic, severe, refractory pain  
 naloxone (NARCAN) 4 mg/actuation nasal spray 1 [place] by IntraNASal route as needed. As needed for opioid respiratory emergency only  DULoxetine (CYMBALTA) 30 mg capsule Take 30 mg by mouth three (3) times daily.  vitamin E (AQUA GEMS) 400 unit capsule Take  by mouth daily.  ergocalciferol (VITAMIN D2) 50,000 unit capsule Take 50,000 Units by mouth.  levothyroxine (SYNTHROID) 25 mcg tablet Take 0.045 mcg by mouth Daily (before breakfast).  ATORVASTATIN CALCIUM (LIPITOR PO) Take  by mouth.  diphenhydrAMINE (BENADRYL) 50 mg tablet Take 50 mg by mouth nightly as needed.  eletriptan (RELPAX) 40 mg tablet Take 40 mg by mouth once as needed. may repeat in 2 hours if necessary  promethazine (PHENERGAN) 25 mg tablet Take 25 mg by mouth every six (6) hours as needed.  albuterol (PROVENTIL HFA, VENTOLIN HFA) 90 mcg/actuation inhaler Take 1 Puff by inhalation.  garlic 1 mg Cap Take  by mouth.  CINNAMON BARK (CINNAMON PO) Take  by mouth.  Flaxseed Oil Oil by Does Not Apply route.  lidocaine hcl, pf, 200 mg/mL (20 %) Soln by IntraVENous route.  DULoxetine (CYMBALTA) 60 mg capsule Take 60 mg by mouth daily.  omeprazole (PRILOSEC) 20 mg capsule Take 20 mg by mouth daily.  calcium-cholecalciferol, D3, (CALTRATE 600+D) tablet Take 1 Tab by mouth daily.  metFORMIN (GLUCOPHAGE) 500 mg tablet Take  by mouth two (2) times daily (with meals).  propranolol LA (INDERAL LA) 60 mg SR capsule Take  by mouth daily. No current facility-administered medications on file prior to visit. ROS:. 
Denies chills,constipation, abdominal pain, weakness, changes in hearing, falls, dizziness, , bowel incontinence,  suicidal ideations, homicidal ideations or alcohol use. Positive findings include fever, nausea, vomiting, diarrhea, chest pain, shortness of breath, trouble swallowing, changes in vision, bladder accidents, depression, anxiety Opioid specific risk: Depression, diabetes, GERD, obesity. Opioid specific history: Concurrent use of opioids since approximately 20 years with no opioid holiday. Vitals:  
 08/29/18 1241 BP: 118/88 Pulse: 96  
Resp: 18 Temp: 98.7 °F (37.1 °C) TempSrc: Oral  
Weight: 142.9 kg (315 lb) Height: 5' 7\" (1.702 m) PainSc:   5 PainLoc: Back Imaging: 
\"\"\"\" 5/11/15 MRI lumbar spine without contrast 
Impression: L4-L5 disc degenerative posterior rightward intraspinal disc extrusion making contact with the right L5 nerve within the lateral recess of the spinal canal.  The disc extrusion extends into the inferior recess of the right neural foramen but does not affect the right and intraforaminal L4 nerve. The L4-L5 intraspinal disc extrusion is decreased in size since the comparison examination of October 17, 2002 but there is worsened narrowing of the right lateral recess of the spinal canal and extrusion of the disc extrusion into the inferior recess of the L4-L5 right neural foramen. \"\"\"\" Physical exam: 
AFVSS, no acute distress, obese body habitus. A&OXs 3. Normocephalic, atraumatic. Conjugate gaze, clear sclerae. Speech is clear and appropriate. Mood is appropriate and patient is cooperative. Gait and balance are within functional limits. Non-labored breathing. Lungs CTA B/L. No wheezing, rales or rhonchi. Heart RRR with S1 and S2 noted. No murmurs. Thoracic, lumbar, bilateral SIJ, left GT TTP Left knee no crepitus noted on flexion and extension. Left pes anserine bursa tender to palpation LE:  
 Strength for right lower extremity is 5/5 for hip flexion, knee extension, dorsiflexion, extensor hallucis longus, plantar flexion. Strength for left lower extremity is 5/5 for hip flexion, knee extension, dorsiflexion, extensor hallucis longus, plantar flexion. Sensation to light touch is intact for right L2-S2. Sensation to light touch is intact for left L2-S2. Muscle Stretch reflexes for right lower extremity are 2+ for L4, absent S1. Muscle Stretch reflexes for left lower extremity are 2+ for L4, absent S1. Negative seated straight leg raise left Positive seated straight leg raise right Full AROM lumbar flexion decreased by 70% to endrange reproduction of primary pain. Full AROM lumbar extension decreased by 50% to endrange reproduction of primary pain. Exam limited by patient's ability to lie on exam table due to increased pain Calculated MEQ - 38 Naloxone rescue - no Prophylactic bowel program - no Date of last OCA 12/12/17 Last UDS 05/30/18, consistent  date checked today, findings consistent Primary Care Physician FLAQUITO Chin[address] 
[phone] Today   Last Visit ORT - 11 PGIC -4 and 3   
ZA -84% COMM -27  15 PHQ -- . PHQ over the last two weeks 8/29/2018 PHQ Not Done Active Diagnosis of Depression or Bipolar Disorder Little interest or pleasure in doing things - Feeling down, depressed, irritable, or hopeless - Total Score PHQ 2 -  
 
 
DSM V-OUD Screen - deferred to next visit Assessment/Plan: ICD-10-CM ICD-9-CM 1. Chronic bilateral low back pain, with sciatica presence unspecified M54.5 724.2 REFERRAL TO PAIN MANAGEMENT  
 G89.29 338.29 HYDROcodone-acetaminophen (NORCO) 7.5-325 mg per tablet 2. Encounter for long-term (current) use of high-risk medication Z79.899 V58.69   
3. Chronic pain syndrome G89.4 338.4 REFERRAL TO PAIN MANAGEMENT HYDROcodone-acetaminophen (NORCO) 7.5-325 mg per tablet 4. Sacroiliitis ([address] M46.1 720.2 REFERRAL TO PAIN MANAGEMENT 5. Chronic pain of left knee M25.562 719.46 REFERRAL TO PAIN MANAGEMENT  
 G89.29 338.29 HYDROcodone-acetaminophen (NORCO) 7.5-325 mg per tablet 6. Pes anserinus bursitis of left knee M70.52 726.61 REFERRAL TO PAIN MANAGEMENT 7. Trochanteric bursitis of left hip M70.62 726.5 REFERRAL TO PAIN MANAGEMENT Referral to Dr Vasquez Look for epidural steroid injections Pt to continue TENS as needed for pain Pt to continue OTC tylenol 500 mg 1-2 tabs TID PRN pain. 3000 mg daily max dose. Do not recommend long term opioid therapy for this patient at this time. Pt currently taking Belbuca 300 MCG film 1 every 12 hours and Hydrocodone/ Acetaminophen 10 mg/325 mg tablet  one tablet two times daily prn pain. Their MME is 38. Today, we will continue the weaning of patients opioid medication with a goal of being opioid free, pending safety and compliance. Pt instructed to call if they experience any signs of withdrawal (diarrhea, nausea, vomiting, sweating or chills, agitation, itching). Today, pt given prescription for  Belbuca 300 MCG film 1 every 12 hours for pain (patient has new prescription already) and Hydrocodone/ Acetaminophen 7.5 mg/325 mg tablet  one tablet  Up to BID daily prn pain. Their new MME is 33. Pt instructed to call 4 days before they run out of their medications for refill. At this time pt will be provided with Belbuca 150 MCG film 1 films in am and 2 film in evening every 12 hours for pain and Hydrocodone/ Acetaminophen 7.5 mg/325 mg tablet  one tablet  Up to BID daily prn pain. pending safety and compliance. At following refill, pt will be provided with Belbuca 150 MCG film 1 films in am and 1 film in evening every 12 hours for pain and Hydrocodone/ Acetaminophen 7.5 mg/325 mg tablet  one tablet up to BID daily prn pain. At next office visit, the plan is to Belbuca 150 MCG film 1 film every 24 hours for pain and Hydrocodone/ Acetaminophen 5mg/325 mg tablet  one tablet up to BID daily prn pain. Their new MME will be 14.5. If patient has difficulty with the wean or difficulty with cravings we will consider referral to mental health for ongoing assessment and treatment for opioid use disorder. Consider SIJ injections, pes anserine bursa left knee,physical therapy Follow up ongoing assessment and ongoing development of integrative and comprehensive plan of care for chronic pain. Goals: To establish complementary and integrative plan of care to address chronic pain issues while minimizing pharmaceuticals to maximize patient's function improve quality of life. Education: 
Patient again educated on the importance of strict compliance with the opioid care agreement while on opioid therapy. Patient also again educated that they should avoid driving while on chronic opioid therapy. Also advised to avoid alcohol and to avoid benzodiazepines while on opioid therapy. Handouts given regarding opioid safety and sleep health. Patient Homework: 
Continue to independently investigate yoga for persons with chronic pain. Follow-up Disposition: 
Return in about 3 months (around 11/29/2018). [address] was used for portions of this report. Unintended errors may occur.

## 2018-08-29 NOTE — MR AVS SNAPSHOT
[address] [address] 
[phone] Patient: Shakira Bravo MRN: [id number] :1965 Visit Information Date & Time Provider Department Dept. Phone Encounter #  
 2018  1:00 PM Richard Decker NP [address] [facility] [phone] Follow-up Instructions Return in about 3 months (around 2018). Upcoming Health Maintenance Date Due Hepatitis C Screening 1965 DTaP/Tdap/Td series (1 - Tdap) 1984 PAP AKA CERVICAL CYTOLOGY 1984 BREAST CANCER SCRN MAMMOGRAM 2013 FOBT Q 1 YEAR AGE 50-75 2013 MEDICARE YEARLY EXAM 3/14/2018 Influenza Age 5 to Adult 2018 Allergies as of 2018  Review Complete On: 2018 By: Richard Decker NP Severity Noted Reaction Type Reactions Iodinated Contrast- Oral And Iv Dye High 2013   Systemic Anaphylaxis Iodine    Anaphylaxis Morphine    Nausea and Vomiting Nsaids (Non-steroidal Anti-inflammatory Drug)    Other (comments) Severe edema Current Immunizations  Never Reviewed No immunizations on file. Not reviewed this visit You Were Diagnosed With   
  
 Codes Comments Chronic bilateral low back pain, with sciatica presence unspecified    -  Primary ICD-10-CM: M54.5, G89.29 ICD-9-CM: 724.2, 338.29 Encounter for long-term (current) use of high-risk medication     ICD-10-CM: Z79.899 ICD-9-CM: V58.69 Chronic pain syndrome     ICD-10-CM: G89.4 ICD-9-CM: 338.4 [facility]     ICD-10-CM: M46.1 ICD-9-CM: 720.2 Chronic pain of left knee     ICD-10-CM: M25.562, G89.29 ICD-9-CM: 719.46, 338.29 Pes anserinus bursitis of left knee     ICD-10-CM: M70.52 ICD-9-CM: 726.61 Trochanteric bursitis of left hip     ICD-10-CM: M70.62 ICD-9-CM: 726.5 Vitals BP Pulse Temp Resp Height(growth percentile) Weight(growth percentile) 118/88 (BP 1 Location: Left arm, BP Patient Position: Sitting) 96 98.7 °F (37.1 °C) (Oral) 18 5' 7\" (1.702 m) 315 lb (142.9 kg) BMI OB Status Smoking Status 49.34 kg/m2 Hysterectomy Never Smoker BMI and BSA Data Body Mass Index Body Surface Area  
 49.34 kg/m 2 2.6 m 2 Preferred Pharmacy Pharmacy Name Phone [facility] [phone] Your Updated Medication List  
  
   
This list is accurate as of 8/29/18  2:01 PM.  Always use your most recent med list.  
  
  
  
  
 albuterol 90 mcg/actuation inhaler Commonly known as:  PROVENTIL HFA, VENTOLIN HFA, PROAIR HFA Take 1 Puff by inhalation. buprenorphine HCl 300 mcg Film buccal film Commonly known as:  BELBUCA  
1 Film by Buccal route every twelve (12) hours for 30 days. Max Daily Amount: 2 Film. for chronic, severe, refractory pain  
  
 calcium-cholecalciferol (D3) tablet Commonly known as:  CALTRATE 600+D Take 1 Tab by mouth daily. CINNAMON PO Take  by mouth. * CYMBALTA 30 mg capsule Generic drug:  DULoxetine Take 30 mg by mouth three (3) times daily. * CYMBALTA 60 mg capsule Generic drug:  DULoxetine Take 60 mg by mouth daily. diphenhydrAMINE 50 mg tablet Commonly known as:  BENADRYL Take 50 mg by mouth nightly as needed. Flaxseed Oil Oil  
by Does Not Apply route.  
  
 garlic 1 mg Cap Take  by mouth. HYDROcodone-acetaminophen 7.5-325 mg per tablet Commonly known as:  March Castles Take 1 Tab by mouth two (2) times daily as needed for Pain for up to 30 days. Max Daily Amount: 2 Tabs. INDERAL LA 60 mg SR capsule Generic drug:  propranolol LA Take  by mouth daily. levothyroxine 25 mcg tablet Commonly known as:  SYNTHROID Take 0.045 mcg by mouth Daily (before breakfast). lidocaine hcl (pf) 200 mg/mL (20 %) Soln  
by IntraVENous route. LIPITOR PO Take  by mouth.  
  
 metFORMIN 500 mg tablet Commonly known as:  GLUCOPHAGE Take  by mouth two (2) times daily (with meals). naloxone 4 mg/actuation nasal spray Commonly known as:  NARCAN  
1 [place] by IntraNASal route as needed. As needed for opioid respiratory emergency only  
  
 omeprazole 20 mg capsule Commonly known as:  PRILOSEC Take 20 mg by mouth daily. promethazine 25 mg tablet Commonly known as:  PHENERGAN Take 25 mg by mouth every six (6) hours as needed. RELPAX 40 mg tablet Generic drug:  eletriptan Take 40 mg by mouth once as needed. may repeat in 2 hours if necessary VITAMIN D2 50,000 unit capsule Generic drug:  ergocalciferol Take 50,000 Units by mouth.  
  
 vitamin E 400 unit capsule Commonly known as:  [address] Take  by mouth daily. * Notice: This list has 2 medication(s) that are the same as other medications prescribed for you. Read the directions carefully, and ask your doctor or other care provider to review them with you. Prescriptions Printed Refills HYDROcodone-acetaminophen (NORCO) 7.5-325 mg per tablet 0 Sig: Take 1 Tab by mouth two (2) times daily as needed for Pain for up to 30 days. Max Daily Amount: 2 Tabs. Class: Print Route: Oral  
  
We Performed the Following REFERRAL TO PAIN MANAGEMENT [CKX885 Custom] Comments:  
 51 y/o female with HX of chronic back pain please evaluate and treat with epidural steroid injection Follow-up Instructions Return in about 3 months (around 11/29/2018). Referral Information Referral ID Referred By Referred To  
  
 [id number] Clark BLOUNT MD [address], [address] [place] [address] Phone: [phone] Fax: [phone] Visits Status Start Date End Date 1 New Request 8/29/18 8/29/19 If your referral has a status of pending review or denied, additional information will be sent to support the outcome of this decision. Patient Instructions Learning About Sleeping Well What does sleeping well mean? Sleeping well means getting enough sleep. How much sleep is enough varies among people. The number of hours you sleep is not as important as how you feel when you wake up. If you do not feel refreshed, you probably need more sleep. Another sign of not getting enough sleep is feeling tired during the day. The average total nightly sleep time is 7½ to 8 hours. Healthy adults may need a little more or a little less than this. Why is getting enough sleep important? Getting enough quality sleep is a basic part of good health. When your sleep suffers, your mood and your thoughts can suffer too. You may find yourself feeling more grumpy or stressed. Not getting enough sleep also can lead to serious problems, including injury, accidents, anxiety, and depression. What might cause poor sleeping? Many things can cause sleep problems, including: · Stress. Stress can be caused by fear about a single event, such as giving a speech. Or you may have ongoing stress, such as worry about work or school. · Depression, anxiety, and other mental or emotional conditions. · Changes in your sleep habits or surroundings. This includes changes that happen where you sleep, such as noise, light, or sleeping in a different bed. It also includes changes in your sleep pattern, such as having jet lag or working a late shift. · Health problems, such as pain, breathing problems, and restless legs syndrome. · Lack of regular exercise. How can you help yourself? Here are some tips that may help you sleep more soundly and wake up feeling more refreshed. Your sleeping area · Use your bedroom only for sleeping and sex.  A bit of light reading may help you fall asleep. But if it doesn't, do your reading elsewhere in the house. Don't watch TV in bed. · Be sure your bed is big enough to stretch out comfortably, especially if you have a sleep partner. · Keep your bedroom quiet, dark, and cool. Use curtains, blinds, or a sleep mask to block out light. To block out noise, use earplugs, soothing music, or a \"white noise\" machine. Your evening and bedtime routine · Create a relaxing bedtime routine. You might want to take a warm shower or bath, listen to soothing music, or drink a cup of noncaffeinated tea. · Go to bed at the same time every night. And get up at the same time every morning, even if you feel tired. What to avoid · Limit caffeine (coffee, tea, caffeinated sodas) during the day, and don't have any for at least 4 to 6 hours before bedtime. · Don't drink alcohol before bedtime. Alcohol can cause you to wake up more often during the night. · Don't smoke or use tobacco, especially in the evening. Nicotine can keep you awake. · Don't take naps during the day, especially close to bedtime. · Don't lie in bed awake for too long. If you can't fall asleep, or if you wake up in the middle of the night and can't get back to sleep within 15 minutes or so, get out of bed and go to another room until you feel sleepy. · Don't take medicine right before bed that may keep you awake or make you feel hyper or energized. Your doctor can tell you if your medicine may do this and if you can take it earlier in the day. If you can't sleep · Imagine yourself in a peaceful, pleasant scene. Focus on the details and feelings of being in a place that is relaxing. · Get up and do a quiet or boring activity until you feel sleepy. · Don't drink any liquids after 6 p.m. if you wake up often because you have to go to the bathroom. Where can you learn more? Go to http://rossana-christelle.info/. Enter R778 in the search box to learn more about \"Learning About Sleeping Well. \" Current as of: December 7, 2017 Content Version: 11.7 © 0899-6776 [employer]Learning. Care instructions adapted under license by [employer] (which disclaims liability or warranty for this information). If you have questions about a medical condition or this instruction, always ask your healthcare professional. [address] any warranty or liability for your use of this information. Safe Use of Opioid Pain Medicine: Care Instructions Your Care Instructions Pain is your body's way of warning you that something is wrong. Pain feels different for everybody. Only you can describe your pain. A doctor can suggest or prescribe many types of medicines for pain. These range from over-the-counter medicines like acetaminophen (Tylenol) to powerful medicines called opioids. Examples of opioids are fentanyl, hydrocodone, morphine, and oxycodone. Heroin is an illegal opioid Opioids are strong medicines. They can help you manage pain when you use them the right way. But if you misuse them, they can cause serious harm and even death. For these reasons, doctors are very careful about how they prescribe opioids. If you decide to take opioids, here are some things to remember. · Keep your doctor informed. You can get addicted to opioids. The risk is higher if you have a history of substance use. Your doctor will monitor you closely for signs of misuse and addiction and to figure out when you no longer need to take opioids. · Make a treatment plan. The goal of your plan is to be able to function and do the things you need to do, even if you still have some pain. You might be able to manage your pain with other non-opioid options like physical therapy, relaxation, or over-the-counter pain medicines. · Be aware of the side effects.  Opioids can cause serious side effects, such as constipation, dry mouth, and nausea. And over time, you may need a higher dose to get pain relief. This is called tolerance. Your body also gets used to opioids. This is called physical dependence. If you suddenly stop taking them, you may have withdrawal symptoms. The doctor carefully considered what pain medicine is right for you. You may not have received opioids if your doctor was concerned about drug interactions or your safety, or if he or she had other concerns. It is best to have one doctor or clinic treat your pain. This way you will get the pain medicine that will help you the most. And a doctor will be able to watch for any problems that the medicine might cause. The doctor has checked you carefully, but problems can develop later. If you notice any problems or new symptoms,  get medical treatment right away. Follow-up care is a key part of your treatment and safety. Be sure to make and go to all appointments, and call your doctor if you are having problems. It's also a good idea to know your test results and keep a list of the medicines you take. How can you care for yourself at home? · If you need to take opioids to manage your pain, remember these safety tips. ¨ Follow directions carefully. It's easy to misuse opioids if you take a dose other than what's prescribed by your doctor. This can lead to overdose and even death. Even sharing them with someone they weren't meant for is misuse. ¨ Be cautious. Opioids may affect your judgment and decision making. Do not drive or operate machinery until you can think clearly. Talk with your doctor about when it is safe to drive. ¨ Reduce the risk of drug interactions. Opioids can be dangerous if you take them with alcohol or with certain drugs like sleeping pills and muscle relaxers. Make sure your doctor knows about all the other medicines you take, including over-the-counter medicines.  Don't start any new medicines before you talk to your doctor or pharmacist. 
Eddye Butts Keep others safe. Store opioids in a safe and secure place. Make sure that pets, children, friends, and family can't get to them. When you're done using opioids, make sure to properly dispose of them. You can either use a community drug take-back program or your drugstore's mail-back program. If one of these programs isn't available, you can flush opioid skin patches and unused opioid pills down the toilet. ¨ Reduce the risk of overdose. Misuse of opioids can be very dangerous. Protect yourself by asking your doctor about a naloxone rescue kit. It can help you-and even save your life-if you take too much of an opioid. · Try other ways to reduce pain. ¨ Relax, and reduce stress. Relaxation techniques such as deep breathing or meditation can help. ¨ Keep moving. Gentle, daily exercise can help reduce pain over the long run. Try low- or no-impact exercises such as walking, swimming, and stationary biking. Do stretches to stay flexible. ¨ Try heat, cold packs, and massage. ¨ Get enough sleep. Pain can make you tired and drain your energy. Talk with your doctor if you have trouble sleeping because of pain. ¨ Think positive. Your thoughts can affect your pain level. Do things that you enjoy to distract yourself when you have pain instead of focusing on the pain. See a movie, read a book, listen to music, or spend time with a friend. · If you are not taking a prescription pain medicine, ask your doctor if you can take an over-the-counter medicine. When should you call for help? Call your doctor now or seek immediate medical care if: 
  · You have a new kind of pain.  
  · You have new symptoms, such as a fever or rash, along with the pain.  
 Watch closely for changes in your health, and be sure to contact your doctor if: 
  · You think you might be using too much pain medicine, and you need help to use less or stop.  
  · Your pain gets worse.   · You would like a referral to a doctor or clinic that specializes in pain management. Where can you learn more? Go to http://rossana-christelle.info/. Enter R108 in the search box to learn more about \"Safe Use of Opioid Pain Medicine: Care Instructions. \" Current as of: September 10, 2017 Content Version: 11.7 © 5606-5414 [employer]utch. Care instructions adapted under license by [employer] (which disclaims liability or warranty for this information). If you have questions about a medical condition or this instruction, always ask your healthcare professional. [address] any warranty or liability for your use of this information. Introducing [facility]! [employer] introduces [facility] patient portal. Now you can access parts of your medical record, email your doctor's office, and request medication refills online. 1. In your internet browser, go to [email or web address] 2. Click on the First Time User? Click Here link in the Sign In box. You will see the New Member Sign Up page. 3. Enter your [employer] [facility] Access Code exactly as it appears below. You will not need to use this code after youve completed the sign-up process. If you do not sign up before the expiration date, you must request a new code. · [employer] Access Code: [email or web address] Expires: 11/27/2018  2:01 PM 
 
4. Enter the last four digits of your Social Security Number (xxxx) and Date of Birth (mm/dd/yyyy) as indicated and click Submit. You will be taken to the next sign-up page. 5. Create a [employer] ID. This will be your [facility] login ID and cannot be changed, so think of one that is secure and easy to remember. 6. Create a [facility] password. You can change your password at any time. 7. Enter your Password Reset Question and Answer. This can be used at a later time if you forget your password. 8. Enter your e-mail address. You will receive e-mail notification when new information is available in [address] 9. Click Sign Up. You can now view and download portions of your medical record. 10. Click the Download Summary menu link to download a portable copy of your medical information. If you have questions, please visit the Frequently Asked Questions section of the [employer] website. Remember, [facility] is NOT to be used for urgent needs. For medical emergencies, dial 911. Now available from your iPhone and Android! Please provide this summary of care documentation to your next provider. Your primary care clinician is listed as Jeimy Walters. If you have any questions after today's visit, please call [phone].

## 2018-08-31 ENCOUNTER — DOCUMENTATION ONLY (OUTPATIENT)
Dept: PAIN MANAGEMENT | Age: 53
End: 2018-08-31

## 2018-10-05 ENCOUNTER — TELEPHONE (OUTPATIENT)
Dept: PAIN MANAGEMENT | Age: 53
End: 2018-10-05

## 2018-10-05 DIAGNOSIS — G89.4 CHRONIC PAIN SYNDROME: ICD-10-CM

## 2018-10-05 DIAGNOSIS — Z79.899 ENCOUNTER FOR LONG-TERM (CURRENT) USE OF HIGH-RISK MEDICATION: Primary | ICD-10-CM

## 2018-10-05 NOTE — TELEPHONE ENCOUNTER
Medicine refill called in [id number]  1:45pm    1. Verified  2. Medicine - norco, buprenorphine  3.  [phone]  4. Analgesia - 70%  5. ADL - don't do any of things listed  6.   Adverse reaction - no

## 2018-10-08 RX ORDER — BUPRENORPHINE HCL 150 MCG
FILM, MEDICATED (EA) BUCCAL
Qty: 90 FILM | Refills: 0 | Status: SHIPPED | OUTPATIENT
Start: 2018-10-08 | End: 2018-11-07 | Stop reason: SDUPTHER

## 2018-10-08 RX ORDER — HYDROCODONE BITARTRATE AND ACETAMINOPHEN 7.5; 325 MG/1; MG/1
1 TABLET ORAL
Qty: 60 TAB | Refills: 0 | Status: SHIPPED | OUTPATIENT
Start: 2018-10-08 | End: 2018-11-07 | Stop reason: SDUPTHER

## 2018-10-08 NOTE — TELEPHONE ENCOUNTER
Kadie Bhatti has called requesting a refill of their controlled medication, norco and belbuca, for the management of chronic generalized pain . Last office visit date: 08/29/18    Date last  was pulled and reviewed : 10/08/18    Was the patient compliant when the above report was pulled? yes    Analgesia: 70% pain relief noted     Aberrancies: none noted     ADL's: patient is not able to complete adl care    Adverse Reaction: none noted     Provider's last note and plan of care reviewed? yes  Request forwarded to provider for review.

## 2018-11-07 DIAGNOSIS — Z79.899 ENCOUNTER FOR LONG-TERM (CURRENT) USE OF HIGH-RISK MEDICATION: ICD-10-CM

## 2018-11-07 DIAGNOSIS — G89.4 CHRONIC PAIN SYNDROME: ICD-10-CM

## 2018-11-07 RX ORDER — OMEPRAZOLE 20 MG/1
20 CAPSULE, DELAYED RELEASE ORAL DAILY
OUTPATIENT
Start: 2018-11-07

## 2018-11-07 RX ORDER — BUPRENORPHINE HCL 150 MCG
FILM, MEDICATED (EA) BUCCAL
Qty: 60 FILM | Refills: 0 | Status: SHIPPED | OUTPATIENT
Start: 2018-11-07 | End: 2018-11-27 | Stop reason: SDUPTHER

## 2018-11-07 RX ORDER — HYDROCODONE BITARTRATE AND ACETAMINOPHEN 7.5; 325 MG/1; MG/1
1 TABLET ORAL
Qty: 60 TAB | Refills: 0 | Status: SHIPPED | OUTPATIENT
Start: 2018-11-07 | End: 2018-11-27 | Stop reason: SDUPTHER

## 2018-11-07 NOTE — TELEPHONE ENCOUNTER
Pelon Bowers has called requesting a refill of their controlled medication, belbuca,hydrodone, and diclofenac gel, for the management of back pain. Last office visit date: 8/29/18 with Ferol Fret, next 11/27/18 with Ferol Fret  Last opioid care agreement 12/12/17  Last UDS was done 6/2/18    Date last  was pulled and reviewed : 11/7/18 last filled belbuca on 10/11, hydrocodone on 10/10/18 , diclofenac from [facility] [address]    Was the patient compliant when the above report was pulled? yes    Analgesia: 65%    Aberrancies: none    ADL's: yes    Adverse Reaction: no    Provider's last note and plan of care reviewed? yes  Request forwarded to provider for review.

## 2018-11-27 ENCOUNTER — OFFICE VISIT (OUTPATIENT)
Dept: PAIN MANAGEMENT | Age: 53
End: 2018-11-27

## 2018-11-27 VITALS
TEMPERATURE: 98.5 F | HEART RATE: 125 BPM | OXYGEN SATURATION: 96 % | WEIGHT: 293 LBS | BODY MASS INDEX: 45.99 KG/M2 | SYSTOLIC BLOOD PRESSURE: 159 MMHG | HEIGHT: 67 IN | RESPIRATION RATE: 16 BRPM | DIASTOLIC BLOOD PRESSURE: 90 MMHG

## 2018-11-27 DIAGNOSIS — G89.29 CHRONIC PAIN OF LEFT KNEE: ICD-10-CM

## 2018-11-27 DIAGNOSIS — G89.29 CHRONIC BILATERAL LOW BACK PAIN, WITH SCIATICA PRESENCE UNSPECIFIED: Primary | ICD-10-CM

## 2018-11-27 DIAGNOSIS — G89.4 CHRONIC PAIN SYNDROME: ICD-10-CM

## 2018-11-27 DIAGNOSIS — M46.1 SACROILIITIS (HCC): ICD-10-CM

## 2018-11-27 DIAGNOSIS — M54.5 CHRONIC BILATERAL LOW BACK PAIN, WITH SCIATICA PRESENCE UNSPECIFIED: Primary | ICD-10-CM

## 2018-11-27 DIAGNOSIS — M70.52 PES ANSERINUS BURSITIS OF LEFT KNEE: ICD-10-CM

## 2018-11-27 DIAGNOSIS — M70.62 TROCHANTERIC BURSITIS OF LEFT HIP: ICD-10-CM

## 2018-11-27 DIAGNOSIS — Z79.899 ENCOUNTER FOR LONG-TERM (CURRENT) USE OF HIGH-RISK MEDICATION: ICD-10-CM

## 2018-11-27 DIAGNOSIS — M25.562 CHRONIC PAIN OF LEFT KNEE: ICD-10-CM

## 2018-11-27 RX ORDER — HYDROCODONE BITARTRATE AND ACETAMINOPHEN 7.5; 325 MG/1; MG/1
1 TABLET ORAL
Qty: 60 TAB | Refills: 0 | Status: SHIPPED | OUTPATIENT
Start: 2018-12-11 | End: 2019-01-10 | Stop reason: SDUPTHER

## 2018-11-27 RX ORDER — DICLOFENAC SODIUM 10 MG/G
GEL TOPICAL 4 TIMES DAILY
COMMUNITY

## 2018-11-27 RX ORDER — BUPRENORPHINE HCL 150 MCG
FILM, MEDICATED (EA) BUCCAL
Qty: 60 FILM | Refills: 0 | Status: SHIPPED | OUTPATIENT
Start: 2018-12-14 | End: 2019-01-10 | Stop reason: SDUPTHER

## 2018-11-27 NOTE — PATIENT INSTRUCTIONS
Learning About Sleeping Well What does sleeping well mean? Sleeping well means getting enough sleep. How much sleep is enough varies among people. The number of hours you sleep is not as important as how you feel when you wake up. If you do not feel refreshed, you probably need more sleep. Another sign of not getting enough sleep is feeling tired during the day. The average total nightly sleep time is 7½ to 8 hours. Healthy adults may need a little more or a little less than this. Why is getting enough sleep important? Getting enough quality sleep is a basic part of good health. When your sleep suffers, your mood and your thoughts can suffer too. You may find yourself feeling more grumpy or stressed. Not getting enough sleep also can lead to serious problems, including injury, accidents, anxiety, and depression. What might cause poor sleeping? Many things can cause sleep problems, including: · Stress. Stress can be caused by fear about a single event, such as giving a speech. Or you may have ongoing stress, such as worry about work or school. · Depression, anxiety, and other mental or emotional conditions. · Changes in your sleep habits or surroundings. This includes changes that happen where you sleep, such as noise, light, or sleeping in a different bed. It also includes changes in your sleep pattern, such as having jet lag or working a late shift. · Health problems, such as pain, breathing problems, and restless legs syndrome. · Lack of regular exercise. How can you help yourself? Here are some tips that may help you sleep more soundly and wake up feeling more refreshed. Your sleeping area · Use your bedroom only for sleeping and sex. A bit of light reading may help you fall asleep. But if it doesn't, do your reading elsewhere in the house. Don't watch TV in bed. · Be sure your bed is big enough to stretch out comfortably, especially if you have a sleep partner. · Keep your bedroom quiet, dark, and cool. Use curtains, blinds, or a sleep mask to block out light. To block out noise, use earplugs, soothing music, or a \"white noise\" machine. Your evening and bedtime routine · Create a relaxing bedtime routine. You might want to take a warm shower or bath, listen to soothing music, or drink a cup of noncaffeinated tea. · Go to bed at the same time every night. And get up at the same time every morning, even if you feel tired. What to avoid · Limit caffeine (coffee, tea, caffeinated sodas) during the day, and don't have any for at least 4 to 6 hours before bedtime. · Don't drink alcohol before bedtime. Alcohol can cause you to wake up more often during the night. · Don't smoke or use tobacco, especially in the evening. Nicotine can keep you awake. · Don't take naps during the day, especially close to bedtime. · Don't lie in bed awake for too long. If you can't fall asleep, or if you wake up in the middle of the night and can't get back to sleep within 15 minutes or so, get out of bed and go to another room until you feel sleepy. · Don't take medicine right before bed that may keep you awake or make you feel hyper or energized. Your doctor can tell you if your medicine may do this and if you can take it earlier in the day. If you can't sleep · Imagine yourself in a peaceful, pleasant scene. Focus on the details and feelings of being in a place that is relaxing. · Get up and do a quiet or boring activity until you feel sleepy. · Don't drink any liquids after 6 p.m. if you wake up often because you have to go to the bathroom. Where can you learn more? Go to http://rossana-christelle.info/. Enter T154 in the search box to learn more about \"Learning About Sleeping Well. \" Current as of: December 7, 2017 Content Version: 11.8 © 8680-2638 Healthwise, [employer].  Care instructions adapted under license by [address] (which disclaims liability or warranty for this information). If you have questions about a medical condition or this instruction, always ask your healthcare professional. [address] any warranty or liability for your use of this information. Safe Use of Opioid Pain Medicine: Care Instructions Your Care Instructions Pain is your body's way of warning you that something is wrong. Pain feels different for everybody. Only you can describe your pain. A doctor can suggest or prescribe many types of medicines for pain. These range from over-the-counter medicines like acetaminophen (Tylenol) to powerful medicines called opioids. Examples of opioids are fentanyl, hydrocodone, morphine, and oxycodone. Heroin is an illegal opioid Opioids are strong medicines. They can help you manage pain when you use them the right way. But if you misuse them, they can cause serious harm and even death. For these reasons, doctors are very careful about how they prescribe opioids. If you decide to take opioids, here are some things to remember. · Keep your doctor informed. You can get addicted to opioids. The risk is higher if you have a history of substance use. Your doctor will monitor you closely for signs of misuse and addiction and to figure out when you no longer need to take opioids. · Make a treatment plan. The goal of your plan is to be able to function and do the things you need to do, even if you still have some pain. You might be able to manage your pain with other non-opioid options like physical therapy, relaxation, or over-the-counter pain medicines. · Be aware of the side effects. Opioids can cause serious side effects, such as constipation, dry mouth, and nausea. And over time, you may need a higher dose to get pain relief. This is called tolerance. Your body also gets used to opioids. This is called physical dependence.  If you suddenly stop taking them, you may have withdrawal symptoms. The doctor carefully considered what pain medicine is right for you. You may not have received opioids if your doctor was concerned about drug interactions or your safety, or if he or she had other concerns. It is best to have one doctor or clinic treat your pain. This way you will get the pain medicine that will help you the most. And a doctor will be able to watch for any problems that the medicine might cause. The doctor has checked you carefully, but problems can develop later. If you notice any problems or new symptoms,  get medical treatment right away. Follow-up care is a key part of your treatment and safety. Be sure to make and go to all appointments, and call your doctor if you are having problems. It's also a good idea to know your test results and keep a list of the medicines you take. How can you care for yourself at home? · If you need to take opioids to manage your pain, remember these safety tips. ? Follow directions carefully. It's easy to misuse opioids if you take a dose other than what's prescribed by your doctor. This can lead to overdose and even death. Even sharing them with someone they weren't meant for is misuse. ? Be cautious. Opioids may affect your judgment and decision making. Do not drive or operate machinery until you can think clearly. Talk with your doctor about when it is safe to drive. ? Reduce the risk of drug interactions. Opioids can be dangerous if you take them with alcohol or with certain drugs like sleeping pills and muscle relaxers. Make sure your doctor knows about all the other medicines you take, including over-the-counter medicines. Don't start any new medicines before you talk to your doctor or pharmacist. 
? Keep others safe. Store opioids in a safe and secure place. Make sure that pets, children, friends, and family can't get to them.  When you're done using opioids, make sure to properly dispose of them. You can either use a community drug take-back program or your drugstore's mail-back program. If one of these programs isn't available, you can flush opioid skin patches and unused opioid pills down the toilet. ? Reduce the risk of overdose. Misuse of opioids can be very dangerous. Protect yourself by asking your doctor about a naloxone rescue kit. It can help youand even save your lifeif you take too much of an opioid. · Try other ways to reduce pain. ? Relax, and reduce stress. Relaxation techniques such as deep breathing or meditation can help. ? Keep moving. Gentle, daily exercise can help reduce pain over the long run. Try low- or no-impact exercises such as walking, swimming, and stationary biking. Do stretches to stay flexible. ? Try heat, cold packs, and massage. ? Get enough sleep. Pain can make you tired and drain your energy. Talk with your doctor if you have trouble sleeping because of pain. ? Think positive. Your thoughts can affect your pain level. Do things that you enjoy to distract yourself when you have pain instead of focusing on the pain. See a movie, read a book, listen to music, or spend time with a friend. · If you are not taking a prescription pain medicine, ask your doctor if you can take an over-the-counter medicine. When should you call for help? Call your doctor now or seek immediate medical care if: 
  · You have a new kind of pain.  
  · You have new symptoms, such as a fever or rash, along with the pain.  
 Watch closely for changes in your health, and be sure to contact your doctor if: 
  · You think you might be using too much pain medicine, and you need help to use less or stop.  
  · Your pain gets worse.  
  · You would like a referral to a doctor or clinic that specializes in pain management. Where can you learn more? Go to http://rossana-christelle.info/. Enter R108 in the search box to learn more about \"Safe Use of Opioid Pain Medicine: Care Instructions. \" Current as of: September 10, 2017 Content Version: 11.8 © 3603-9864 Healthwise, [employer]. Care instructions adapted under license by [employer] (which disclaims liability or warranty for this information). If you have questions about a medical condition or this instruction, always ask your healthcare professional. [place] [address] any warranty or liability for your use of this information.

## 2018-11-27 NOTE — PROGRESS NOTES
Nursing Notes Patient presents to the office today in follow-up. Patient rates her pain at 5/10 on the numerical pain scale. Reviewed medications with counts as follows:   
Rx Date filled Qty Dispensed Pill Count Last Dose Short Hydrocodone 7.5-325 mg 11/12/18 60 32 today no  
belbuca 150 mcg 11/13/18 60 32 today no Last opioid agreement 12/12/17   Today L.phq 
Comments: POC UDS was performed in office today Any new labs or imaging since last appointment? NO Have you been to an emergency room (ER) or urgent care clinic since your last visit? NO Have you been hospitalized since your last visit? NO If yes, where, when, and reason for visit? Have you seen or consulted any other health care providers outside of the [address] [place] [address]  since your last visit? NO If yes, where, when, and reason for visit? Ms. Marii Crowe has a reminder for a \"due or due soon\" health maintenance. I have asked that she contact her primary care provider for follow-up on this health maintenance.

## 2018-11-27 NOTE — PROGRESS NOTES
Referral date before 10/31/11, source pain management and for back and joint pain. HPI: 
Dilcia Richard is a 52 y.o. female here for f/u visit for ongoing evaluation of chronic low back and joint pain. Pt was last seen here on 8/29/18. Pt denies interval changes on the character or distribution of pain. Pain is located low back pain with radiating down right leg, sacrum, bilateral knees and right hip pain, posterior shoulder/right clavicle area. The pain is described as aching, burning, stabbing. Pain at its best is 3/10. Pain at its worst is 7/10. The pain is worsened by positional, bending, turning, twisting. Symptoms are improved by cold, heat, laying down, hydrocodone, compound cream, oil compound roll on,epidrual injections, hot tub,ice packs, stretching, aquatic therapy exercises, Biofreeze, [place] outback, diclofenac gel. Pt had hx tailbone broken in the 80's. Since last visit, patient feels she is worse than before she feels weak and tired. Patient is looking into another pain management clinic. Social History Socioeconomic History  Marital status:  Spouse name: Not on file  Number of children: Not on file  Years of education: Not on file  Highest education level: Not on file Social Needs  Financial resource strain: Not on file  Food insecurity - worry: Not on file  Food insecurity - inability: Not on file  Transportation needs - medical: Not on file  Transportation needs - non-medical: Not on file Occupational History  Not on file Tobacco Use  Smoking status: Never Smoker  Smokeless tobacco: Never Used Substance and Sexual Activity  Alcohol use: No  
 Drug use: No  
 Sexual activity: Yes Birth control/protection: Surgical  
  Comment: Hysterectomy Other Topics Concern  Not on file Social History Narrative  Not on file No family history on file. Allergies Allergen Reactions  Iodinated Contrast- Oral And Iv Dye Anaphylaxis  Iodine Anaphylaxis  Morphine Nausea and Vomiting  Nsaids (Non-Steroidal Anti-Inflammatory Drug) Other (comments) Severe edema Past Medical History:  
Diagnosis Date  Degeneration of lumbar intervertebral disc  Depression  Diabetes ([facility] [address]  Dyslipidemia  Esophageal reflux  Fibromyalgia  GERD (gastroesophageal reflux disease)  Hypercholesteremia  Hyperlipidemia  Lumbago  Migraines  Morbid obesity ([facility] [address]  Osteoarthritis  Thyroid disease  Vitamin D deficiency No past surgical history on file. Current Outpatient Medications on File Prior to Visit Medication Sig  
 synthetic conj estrogens b 0.45 mg tablet Take 0.45 mg by mouth daily.  diclofenac (VOLTAREN) 1 % gel Apply  to affected area four (4) times daily.  naloxone (NARCAN) 4 mg/actuation nasal spray 1 [place] by IntraNASal route as needed. As needed for opioid respiratory emergency only  propranolol LA (INDERAL LA) 60 mg SR capsule Take  by mouth daily.  DULoxetine (CYMBALTA) 30 mg capsule Take 30 mg by mouth daily.  levothyroxine (SYNTHROID) 25 mcg tablet Take 25 mcg by mouth Daily (before breakfast).  ATORVASTATIN CALCIUM (LIPITOR PO) Take 10 mg by mouth daily.  eletriptan (RELPAX) 40 mg tablet Take 40 mg by mouth once as needed. may repeat in 2 hours if necessary  promethazine (PHENERGAN) 25 mg tablet Take 25 mg by mouth every six (6) hours as needed.  albuterol (PROVENTIL HFA, VENTOLIN HFA) 90 mcg/actuation inhaler Take 2 Puffs by inhalation every four (4) hours as needed.  CINNAMON BARK (CINNAMON PO) Take  by mouth.  Flaxseed Oil Oil by Does Not Apply route.  omeprazole (PRILOSEC) 20 mg capsule Take 20 mg by mouth daily.  calcium-cholecalciferol, D3, (CALTRATE 600+D) tablet Take 1 Tab by mouth daily.     
 metFORMIN (GLUCOPHAGE) 500 mg tablet Take  by mouth two (2) times daily (with meals).  vitamin E (AQUA GEMS) 400 unit capsule Take  by mouth daily.  ergocalciferol (VITAMIN D2) 50,000 unit capsule Take 50,000 Units by mouth.  diphenhydrAMINE (BENADRYL) 50 mg tablet Take 50 mg by mouth nightly as needed.  garlic 1 mg Cap Take  by mouth.  DULoxetine (CYMBALTA) 60 mg capsule Take 60 mg by mouth daily. No current facility-administered medications on file prior to visit. ROS:. 
Denies fever, chills, vomiting, diarrhea, constipation, chest pain, trouble swallowing, changes in vision, changes in hearing, falls, bowel accidents, thoughts to harm self, thoughts to harm others, alcohol use Positive findings include nausea comes and goes, shortness of breath/trouble breathing following PCP, abdominal pain and weakness chronic not new, dizziness chronic not new, bladder accidents chronic and not new, depression and anxiety chronic not new Opioid specific risk: Depression, diabetes, GERD, obesity. Opioid specific history: Concurrent use of opioids since approximately 20 years with no opioid holiday. Vitals:  
 11/27/18 1515 BP: 159/90 Pulse: (!) 125 Resp: 16 Temp: 98.5 °F (36.9 °C) TempSrc: Oral  
SpO2: 96% Weight: 142.9 kg (315 lb) Height: 5' 7\" (1.702 m) PainSc:   5 PainLoc: Back Imaging: 
\"\"\"\" 5/11/15 MRI lumbar spine without contrast 
Impression: L4-L5 disc degenerative posterior rightward intraspinal disc extrusion making contact with the right L5 nerve within the lateral recess of the spinal canal.  The disc extrusion extends into the inferior recess of the right neural foramen but does not affect the right and intraforaminal L4 nerve. The L4-L5 intraspinal disc extrusion is decreased in size since the comparison examination of October 17, 2002 but there is worsened narrowing of the right lateral recess of the spinal canal and extrusion of the disc extrusion into the inferior recess of the L4-L5 right neural foramen. \"\"\"\" Physical exam: 
AFVS tachycardia, no acute distress, obese body habitus. A&OXs 3. Normocephalic, atraumatic. Conjugate gaze, clear sclerae. Speech is clear and appropriate. Mood is appropriate and patient is cooperative. Gait and balance are within functional limits. Non-labored breathing. Lungs CTA B/L. No wheezing, rales or rhonchi. Heart RRR with S1 and S2 noted. No murmurs. Thoracic, lumbar, bilateral SIJ, left GT TTP Left knee no crepitus noted on flexion and extension. Left pes anserine bursa tender to palpation LE:  
Strength for right lower extremity is 5/5 for hip flexion, knee extension, dorsiflexion, extensor hallucis longus, plantar flexion. Strength for left lower extremity is 5/5 for hip flexion, knee extension, dorsiflexion, extensor hallucis longus, plantar flexion. Sensation to light touch is intact for right L2-S2. Sensation to light touch is intact for left L2-S2. Muscle Stretch reflexes for right lower extremity are 2+ for L4, absent S1. Muscle Stretch reflexes for left lower extremity are 2+ for L4, absent S1. Negative seated straight leg raise left Positive seated straight leg raise right Full AROM lumbar flexion decreased by 75% to endrange reproduction of primary pain. Full AROM lumbar extension decreased by 50% to endrange reproduction of primary pain. Exam limited by patient's ability to lie on exam table due to increased pain Calculated MEQ - 19 Naloxone rescue - no Prophylactic bowel program - no Date of last OCA 11/27/18 Last UDS today, consistent POC, awaiting confirmatory testing  date checked today, findings consistent Primary Care Physician [place]iffanie SteinS[place] [phone] Today   Last Visit   prior visit ORT -    11 PGIC -1 and 8  4 and 3   
ZA -86%  84% COMM -21  24   12 PHQ -- . PHQ over the last two weeks 11/27/2018 PHQ Not Done Active Diagnosis of Depression or Bipolar Disorder Little interest or pleasure in doing things - Feeling down, depressed, irritable, or hopeless - Total Score PHQ 2 -  
 
 
DSM V-OUD Screen - deferred Assessment/Plan: ICD-10-CM ICD-9-CM 1. Chronic bilateral low back pain, with sciatica presence unspecified M54.5 724.2 buprenorphine HCl (BELBUCA) 150 mcg film G89.29 338.29 HYDROcodone-acetaminophen (NORCO) 7.5-325 mg per tablet 2. Encounter for long-term (current) use of high-risk medication Z79.899 V58.69 DRUG SCREEN  
   AMB POC DRUG SCREEN () buprenorphine HCl (BELBUCA) 150 mcg film HYDROcodone-acetaminophen (NORCO) 7.5-325 mg per tablet 3. Chronic pain syndrome G89.4 338.4 buprenorphine HCl (BELBUCA) 150 mcg film HYDROcodone-acetaminophen (NORCO) 7.5-325 mg per tablet 4. Chronic pain of left knee M25.562 719.46 buprenorphine HCl (BELBUCA) 150 mcg film G89.29 338.29 HYDROcodone-acetaminophen (NORCO) 7.5-325 mg per tablet 5. Pes anserinus bursitis of left knee M70.52 726.61   
6. Trochanteric bursitis of left hip M70.62 726.5 7. Sacroiliitis (HCC) M46.1 720.2 UDS today Patient to recheck blood pressure in 2-3 weeks and if still elevated to follow-up with PCP Referral to Dr Dia Garcia for epidural steroid injections last visit patient will follow up once she has a tooth repair completed If patient experiences chest pain she is to go directly to the emergency room Pt to continue TENS as needed for pain Pt to continue diclofenac gel 1.5 % up to 3-4 times a day as needed for pain Pt to continue OTC tylenol 500 mg 1-2 tabs BID PRN pain. 2000 mg daily max dose. Do not recommend long term opioid therapy for this patient at this time.  Pt currently taking Belbuca 150 MCG film one film in the a.m. and one film in the evening for chronic pain and hydrocodone/acetaminophen 5/325 mg tablet take 1 tablet by mouth up to twice daily as needed. Their MME is 19. Today, we will continue the weaning of patients opioid medication with a goal of being opioid free, pending safety and compliance. Pt instructed to call if they experience any signs of withdrawal (diarrhea, nausea, vomiting, sweating or chills, agitation, itching). Today, pt given prescription for Belbuca 150 MCG film one film in the a.m. and one film in the evening for chronic pain and hydrocodone/acetaminophen 5/325 mg tablet take 1 tablet by mouth up to twice daily as needed. Their new MME is 19. Will address short-acting opioid once extended release has been discontinued. Pt instructed to call 5-7 days before they run out of their medications for refill. At this time pt will be provided with Belbuca 150 MCG 1 in am and 1 in pm  for chronic pain and continue hydrocodone/acetaminophen 5/325 mg tablet take 1 tablet up to twice daily as needed pending safety and compliance. At following refill, pt will be provided with Belbuca 75 MCG 1 in am and 1 in pm  for chronic pain and hydrocodone/acetaminophen 5/325 mg tablet take 1 tablet twice daily as needed  pending safety and compliance. At next office visit, the plan is to provide patient with Belbuca 75 MCG 1 daily  for chronic pain and continue hydrocodone/acetaminophen 5/325 mg tablet take 1 tablet up to twice daily as needed. Their new MME will be approximately 12. 3. If patient has difficulty with the wean or difficulty with cravings we will consider referral to mental health for ongoing assessment and treatment for opioid use disorder. Consider SIJ injections, pes anserine bursa left knee,physical therapy, pain psychology, CBT Follow up ongoing assessment and ongoing development of integrative and comprehensive plan of care for chronic pain. Goals: To establish complementary and integrative plan of care to address chronic pain issues while minimizing pharmaceuticals to maximize patient's function improve quality of life. Education: 
Patient again educated on the importance of strict compliance with the opioid care agreement while on opioid therapy. Patient also again educated that they should avoid driving while on chronic opioid therapy. Also advised to avoid alcohol and to avoid benzodiazepines while on opioid therapy. Handouts given regarding opioid safety and sleep health. Patient Homework: 
Continue to independently investigate yoga for persons with chronic pain. Follow-up Disposition: 
Return in about 3 months (around 2/27/2019). [address] was used for portions of this report. Unintended errors may occur.

## 2018-12-01 LAB
ALCOHOL UR POC: NORMAL
AMPHETAMINES UR POC: NEGATIVE
BARBITURATES UR POC: NORMAL
BENZODIAZEPINES UR POC: NEGATIVE
BUPRENORPHINE UR POC: NORMAL
CANNABINOIDS UR POC: NEGATIVE
CARISOPRODOL UR POC: NORMAL
COCAINE UR POC: NEGATIVE
FENTANYL UR POC: NORMAL
MDMA/ECSTASY UR POC: NORMAL
METHADONE UR POC: NORMAL
METHAMPHETAMINE UR POC: NEGATIVE
METHYLPHENIDATE UR POC: NORMAL
OPIATES UR POC: NORMAL
OXYCODONE UR POC: NORMAL
PHENCYCLIDINE UR POC: NORMAL
PROPOXYPHENE UR POC: NORMAL
TRAMADOL UR POC: NORMAL
TRICYCLICS UR POC: NORMAL

## 2019-01-10 DIAGNOSIS — M54.5 CHRONIC BILATERAL LOW BACK PAIN, WITH SCIATICA PRESENCE UNSPECIFIED: ICD-10-CM

## 2019-01-10 DIAGNOSIS — G89.29 CHRONIC BILATERAL LOW BACK PAIN, WITH SCIATICA PRESENCE UNSPECIFIED: ICD-10-CM

## 2019-01-10 DIAGNOSIS — G89.4 CHRONIC PAIN SYNDROME: ICD-10-CM

## 2019-01-10 DIAGNOSIS — Z79.899 ENCOUNTER FOR LONG-TERM (CURRENT) USE OF HIGH-RISK MEDICATION: ICD-10-CM

## 2019-01-10 DIAGNOSIS — G89.29 CHRONIC PAIN OF LEFT KNEE: ICD-10-CM

## 2019-01-10 DIAGNOSIS — M25.562 CHRONIC PAIN OF LEFT KNEE: ICD-10-CM

## 2019-01-10 NOTE — TELEPHONE ENCOUNTER
Willa Bravo has called requesting a refill of their controlled medication, Hydrocodone and Belbuca, for the management of chronic pain. .    Last office visit date: 11/27/1/  Last opioid care agreement 11/27/18  Last UDS was done 11/27/18    Date last  was pulled and reviewed : 1/10/19  Last fill date for medication was 12/13    Was the patient compliant when the above report was pulled? yes    Analgesia: Patient reports 60% pain relief on current regimen. Aberrancies: No aberrancies noted in the last 30 days. ADL's: Patient states they are limited in what they can do , but this is not new. Adverse Reaction: Patient reports no adverse reactions at this time. Provider's last note and plan of care reviewed? yes  Request forwarded to provider for review.

## 2019-01-11 RX ORDER — BUPRENORPHINE HCL 150 MCG
FILM, MEDICATED (EA) BUCCAL
Qty: 60 FILM | Refills: 0 | Status: SHIPPED | OUTPATIENT
Start: 2019-01-11 | End: 2019-02-12 | Stop reason: SDUPTHER

## 2019-01-11 RX ORDER — HYDROCODONE BITARTRATE AND ACETAMINOPHEN 7.5; 325 MG/1; MG/1
1 TABLET ORAL
Qty: 60 TAB | Refills: 0 | Status: SHIPPED | OUTPATIENT
Start: 2019-01-11 | End: 2019-02-12 | Stop reason: SDUPTHER

## 2019-01-11 NOTE — TELEPHONE ENCOUNTER
Attempted to contact patient to inform them their prescription was ready for , but patient did not answer the phone and had to leave a message for the patient to call office back. Clinic number provided.

## 2019-01-11 NOTE — TELEPHONE ENCOUNTER
Reviewed, there is no lidocaine solution in her med list the only thing that I see is a Voltaren gel. There is a 75 mcg of belbuca, please advise patient I will keep prescription the same 1 more month but next month and we will continue the wean.

## 2019-01-14 NOTE — TELEPHONE ENCOUNTER
Patient identified using two patient identifiers (name and ) Patient informed prescriptions ready for .

## 2019-02-12 DIAGNOSIS — M25.562 CHRONIC PAIN OF LEFT KNEE: ICD-10-CM

## 2019-02-12 DIAGNOSIS — G89.29 CHRONIC PAIN OF LEFT KNEE: ICD-10-CM

## 2019-02-12 DIAGNOSIS — G89.29 CHRONIC BILATERAL LOW BACK PAIN, WITH SCIATICA PRESENCE UNSPECIFIED: ICD-10-CM

## 2019-02-12 DIAGNOSIS — Z79.899 ENCOUNTER FOR LONG-TERM (CURRENT) USE OF HIGH-RISK MEDICATION: ICD-10-CM

## 2019-02-12 DIAGNOSIS — G89.4 CHRONIC PAIN SYNDROME: ICD-10-CM

## 2019-02-12 DIAGNOSIS — M54.5 CHRONIC BILATERAL LOW BACK PAIN, WITH SCIATICA PRESENCE UNSPECIFIED: ICD-10-CM

## 2019-02-12 NOTE — TELEPHONE ENCOUNTER
Lisbet Ann-Marie has called requesting a refill of their controlled medication, Norco 7.5/325 mg tab and Belbuca 150 mcg film, for the management of chronic back and joint pain. Last office visit date: 11/27/18 with Maxwell and has a f/u appt on 2/27/19 with Benito Guzman. Last opioid care agreement 11/27/18  Last UDS was done 11/27/18    Date last  was pulled and reviewed : 2/12/19  Last fill date for medication was: 1/16/19(Norco) and 1/17/19(Belbuca). Was the patient compliant when the above report was pulled? yes    Analgesia: Patient reports 55-60% pain relief on current regimen. Aberrancies: No aberrancies noted in the last 30 days. ADL's: Patient states they are not able to perform ADL's d/t them being 100% disabled. Adverse Reaction: Patient reports no adverse reactions at this time. Provider's last note and plan of care reviewed? yes  Request forwarded to provider for review.

## 2019-02-13 DIAGNOSIS — Z79.899 ENCOUNTER FOR LONG-TERM (CURRENT) USE OF HIGH-RISK MEDICATION: Primary | ICD-10-CM

## 2019-02-13 RX ORDER — BUPRENORPHINE HCL 150 MCG
FILM, MEDICATED (EA) BUCCAL
Qty: 60 FILM | Refills: 0 | Status: SHIPPED | OUTPATIENT
Start: 2019-02-15

## 2019-02-13 RX ORDER — NALOXONE HYDROCHLORIDE 4 MG/.1ML
1 SPRAY NASAL AS NEEDED
Qty: 2 EACH | Refills: 0 | Status: SHIPPED | OUTPATIENT
Start: 2019-02-13

## 2019-02-13 RX ORDER — HYDROCODONE BITARTRATE AND ACETAMINOPHEN 7.5; 325 MG/1; MG/1
1 TABLET ORAL
Qty: 60 TAB | Refills: 0 | Status: SHIPPED | OUTPATIENT
Start: 2019-02-14 | End: 2019-02-27 | Stop reason: SDUPTHER

## 2019-02-27 ENCOUNTER — OFFICE VISIT (OUTPATIENT)
Dept: PAIN MANAGEMENT | Age: 54
End: 2019-02-27

## 2019-02-27 VITALS
TEMPERATURE: 96.4 F | DIASTOLIC BLOOD PRESSURE: 88 MMHG | WEIGHT: 293 LBS | HEART RATE: 136 BPM | OXYGEN SATURATION: 99 % | RESPIRATION RATE: 12 BRPM | HEIGHT: 67 IN | BODY MASS INDEX: 45.99 KG/M2 | SYSTOLIC BLOOD PRESSURE: 139 MMHG

## 2019-02-27 DIAGNOSIS — M79.10 MYALGIA: ICD-10-CM

## 2019-02-27 DIAGNOSIS — M25.562 CHRONIC PAIN OF LEFT KNEE: ICD-10-CM

## 2019-02-27 DIAGNOSIS — M53.3 COCCYDYNIA: ICD-10-CM

## 2019-02-27 DIAGNOSIS — Z79.899 ENCOUNTER FOR LONG-TERM (CURRENT) USE OF HIGH-RISK MEDICATION: ICD-10-CM

## 2019-02-27 DIAGNOSIS — M70.62 TROCHANTERIC BURSITIS OF LEFT HIP: ICD-10-CM

## 2019-02-27 DIAGNOSIS — G89.29 CHRONIC BILATERAL LOW BACK PAIN, WITH SCIATICA PRESENCE UNSPECIFIED: Primary | ICD-10-CM

## 2019-02-27 DIAGNOSIS — G89.4 CHRONIC PAIN SYNDROME: ICD-10-CM

## 2019-02-27 DIAGNOSIS — M70.52 PES ANSERINUS BURSITIS OF LEFT KNEE: ICD-10-CM

## 2019-02-27 DIAGNOSIS — G89.29 CHRONIC PAIN OF LEFT KNEE: ICD-10-CM

## 2019-02-27 DIAGNOSIS — M54.5 CHRONIC BILATERAL LOW BACK PAIN, WITH SCIATICA PRESENCE UNSPECIFIED: Primary | ICD-10-CM

## 2019-02-27 RX ORDER — HYDROCODONE BITARTRATE AND ACETAMINOPHEN 7.5; 325 MG/1; MG/1
1 TABLET ORAL
Qty: 60 TAB | Refills: 0 | Status: SHIPPED | OUTPATIENT
Start: 2019-03-17 | End: 2019-04-16

## 2019-02-27 RX ORDER — BUPRENORPHINE HCL 75 MCG
FILM, MEDICATED (EA) BUCCAL
Qty: 90 FILM | Refills: 0 | Status: SHIPPED | OUTPATIENT
Start: 2019-03-20 | End: 2019-04-19

## 2019-02-27 RX ORDER — EZETIMIBE 10 MG/1
10 TABLET ORAL DAILY
COMMUNITY

## 2019-02-27 NOTE — PROGRESS NOTES
Nursing Notes Patient presents to the office today in follow-up. Patient rates her pain at 6/10 on the numerical pain scale. Reviewed medications with counts as follows:   
Rx Date filled Qty Dispensed Pill Count Last Dose Short  
belbuca 150 mcg 02/18/19 60 41 today no  
norco 7.5/325 mg  02/15/19 60 41 today no  
       
       
          
 reviewed YES Any aberrancies noted on  NO Last opioid agreement 11/28/18 Last urine drug screen 11/27/18 Comments: POC UDS was not performed in office today. Any new labs or imaging since last appointment? NO Have you been to an emergency room (ER) or urgent care clinic since your last visit? NO Have you been hospitalized since your last visit? NO If yes, where, when, and reason for visit? Have you seen or consulted any other health care providers outside of the [address] [place] [address]  since your last visit? YES If yes, where, when, and reason for visit? Dr. Jese Calderon for back injection Ms. Kian Dale has a reminder for a \"due or due soon\" health maintenance. I have asked that she contact her primary care provider for follow-up on this health maintenance. 3 most recent PHQ Screens 2/27/2019 PHQ Not Done - Little interest or pleasure in doing things Not at all Feeling down, depressed, irritable, or hopeless Not at all Total Score PHQ 2 0 Trouble falling or staying asleep, or sleeping too much Not at all Feeling tired or having little energy Not at all Poor appetite, weight loss, or overeating Not at all Feeling bad about yourself - or that you are a failure or have let yourself or your family down Not at all Trouble concentrating on things such as school, work, reading, or watching TV Not at all Moving or speaking so slowly that other people could have noticed; or the opposite being so fidgety that others notice Not at all Thoughts of being better off dead, or hurting yourself in some way Not at all  
PHQ 9 Score 0 How difficult have these problems made it for you to do your work, take care of your home and get along with others Not difficult at all Pt states she is being treated for depression by her pcp. She states that her depression is under control at this time.

## 2019-02-27 NOTE — PROGRESS NOTES
Referred prior to 2011 from a different pain management clinic for back and joint pain. Pt was last seen here on November 27, 2018 Calculated MEQ - 15 Naloxone rescue -yes Prophylactic bowel program - no Date of last OCA November 2018. Last UDS November 27, 2018 , date checked  today, findings were consistent. GIC-1 and 7 ZA -84% COMM-16 HPI: 
Lila Kee is a 52 y.o. female here for f/u visit for ongoing evaluation of low back pain and joint pain. Pt denies interval changes in the character or distribution of pain. Pain is located along the lumbosacral belt line is an aching to stabbing pain with burning and in midline from the lumbosacral junction down to the coccyx as an aching and stabbing pain with burning and pins and needles. She also reports radiating stabbing pain down through the right gluteal region posterior thigh posterior calf to the lateral margin of the right foot. Her pain generally ranges from 5-10/10. --Referral for epidural steroid injections With Dr Francia Scott was placed so the patient could receive a epidural steroid injection while under general anesthesia. This was done and the patient says it went well helping to control the lumbosacral belt line pain. However it did nothing to address the coccygeal pain. She reports coccyx pain since an acute onset from a fall many years earlier. She needs a general anesthesia for her epidural steroid injections because she has a very dramatic immediate response of intense pain and muscle contraction/spasming when medication first goes into the epidural space. Apparently this does not happen if she is asleep for the procedure. --TENS units are helpful for spasming. Tylenol as needed with norco is helpful as needed and she does not exceed 3000mg total daily. --Diclofenac gel is helpful. Cymbalta 90 mg daily is helpful pain and depression. Belbuca 150 mcg twice daily Norco 7.5/325 up to twice daily. Pt reports partial but incomplete analgesia with the current opioid regimen which helps to improve activity tolerance and function. Pt denies aberrant behaviors or any adverse effects. ROS:Review of systems is negative for fever, chills, nausea, vomiting, diarrhea, constipation, chest pain, shortness of breath, abdominal pain, weakness, trouble swallowing, acute changes in vision, acute changes in hearing, falls, dizziness, bladder incontinence, bowel incontinence, depression, anxiety, suicidal ideation, homicidal ideation, alcohol use. Review of systems positive for nausea, diarrhea, abdominal pain from chronic hiatal hernia, intermittent trouble swallowing (workup initiated with PCP), chronic bladder incontinence chronic depression. Patient is also reporting intermittent short episodes of dizziness weakness and shortness of breath over the last 3 months is worse when she is in the shower. Opioid specific risk: Depression, diabetes, obesity, GERD Opioid specific history:Concurrent use of opioids since approximately 20 years Vitals:  
 02/27/19 1403 02/27/19 1428 BP: (!) 130/92 139/88 Pulse: (!) 129 (!) 136 Resp: 12 Temp: 96.4 °F (35.8 °C) TempSrc: Oral   
SpO2: 99% Weight: 139.3 kg (307 lb) Height: 5' 7\" (1.702 m) PainSc:   6 PainLoc: Back PE: 
AFVSS with elevated blood pressure and tachycardia, no acute distress, endomorphic body habitus. A&OXs 3. 
normocephalic, atraumatic. Conjugate gaze, clear sclerae. Speech is clear and appropriate. Mood is pleasant and appropriate. Patient is cooperative. Right knee with exquisite tenderness to palpation over the pes anserine bursa. Left knee with exquisite tenderness over the pes anserine bursa, anterior joint line, medial surface of the medial femoral condyle. Skin has multiple patches of erythema in bilateral upper extremities and the face. There is severe tenderness to palpation over the left greater trochanter and also at the coccyx. Gait is modified independent with use of assistive device with antalgia and decreased phoebe. Minor Ronde Balance is within functional limits with use of assistive device. [facility] [address] [facility] 
[phone] PHQ -- . 
3 most recent PHQ Screens 2/27/2019 PHQ Not Done - Little interest or pleasure in doing things Not at all Feeling down, depressed, irritable, or hopeless Not at all Total Score PHQ 2 0 Trouble falling or staying asleep, or sleeping too much Not at all Feeling tired or having little energy Not at all Poor appetite, weight loss, or overeating Not at all Feeling bad about yourself - or that you are a failure or have let yourself or your family down Not at all Trouble concentrating on things such as school, work, reading, or watching TV Not at all Moving or speaking so slowly that other people could have noticed; or the opposite being so fidgety that others notice Not at all Thoughts of being better off dead, or hurting yourself in some way Not at all PHQ 9 Score 0 How difficult have these problems made it for you to do your work, take care of your home and get along with others Not difficult at all Assessment/Plan: ICD-10-CM ICD-9-CM 1. Chronic bilateral low back pain, with sciatica presence unspecified M54.5 724.2 REFERRAL TO PAIN CLINIC  
 G89.29 338.29 buprenorphine HCl (BELBUCA) 75 mcg film HYDROcodone-acetaminophen (NORCO) 7.5-325 mg per tablet 2. Pes anserinus bursitis of left knee M70.52 726.61   
3. Trochanteric bursitis of left hip M70.62 726.5 4. Coccydynia M53.3 724.79 REFERRAL TO PAIN CLINIC  
   buprenorphine HCl (BELBUCA) 75 mcg film HYDROcodone-acetaminophen (NORCO) 7.5-325 mg per tablet 5.  Encounter for long-term (current) use of high-risk medication Z79.899 V58.69 HYDROcodone-acetaminophen (NORCO) 7.5-325 mg per tablet 6. Myalgia M79.10 729.1 7. Chronic pain syndrome G89.4 338.4 HYDROcodone-acetaminophen (NORCO) 7.5-325 mg per tablet 8. Chronic pain of left knee M25.562 719.46 HYDROcodone-acetaminophen (NORCO) 7.5-325 mg per tablet G89.29 338.29   
  
 
 
--f/u as soon as possible with PCP for the reports of intermittent episodes of weakness, dyspnea, dizziness occurring multiple times over the past 3 months with tachycardia today of 120s. She is Asymmptomatic at today's visit. --I do not recommend long-term opioid therapy for this person's chronic pain. I believe the risks outweigh any potential benefits. We will progress with a gradual opioid wean. Patient was educated on signs and symptoms of opioid withdrawal and advised to call the clinic should these symptoms arise so that we may provide support as needed. --At time of refill in March patient will be provided with decrease of Belbuca 75 mcg patch once daily in the mornings and 150 mcg in the evenings. Maintain this rate until next office visit. --Refill Norco 7.5/325 up to twice daily as needed with 60 tablets budgeted over 30 days. Maintain this rate until the buprenorphine has been discontinued. --Continue Cymbalta 30 mg daily Continue use of TENS unit as needed. Continue Tylenol as needed. Continue alternating ice and heat to painful areas as needed. --Patient was educated on the possibility of opioid-induced hyperalgesia as part of her widespread chronic pain pattern. She will continue to independently investigate this phenomenon. --Dr Kelley Barahona referral for consideration of repeat of the lumbar epidural steroid injection and consideration of either a Dreyfus procedure or a ganglion of impar block to help with the coccygeal pain. --Continue consideration for chiropractic referral for the chronic coccygeal pain.  
 
 
GOALS: 
 To establish complementary and integrative plan of care to address chronic pain issues while minimizing pharmaceuticals to maximize patient's function improve quality of life. Education: 
Patient again educated on the importance of strict compliance with the opioid care agreement while on opioid therapy. Patient also again educated that they should avoid driving while on chronic opioid therapy. Also advised to avoid alcohol and to avoid benzodiazepines while on opioid therapy. A total of 34 minutes were spent with the patient, of which more than half of the time was spent counseling and/or coordinating care. F/u:. Follow-up Disposition: 
Return in about 3 months (around 5/27/2019) for 30 min. Social History Socioeconomic History  Marital status:  Spouse name: Not on file  Number of children: Not on file  Years of education: Not on file  Highest education level: Not on file Social Needs  Financial resource strain: Not on file  Food insecurity - worry: Not on file  Food insecurity - inability: Not on file  Transportation needs - medical: Not on file  Transportation needs - non-medical: Not on file Occupational History  Not on file Tobacco Use  Smoking status: Never Smoker  Smokeless tobacco: Never Used Substance and Sexual Activity  Alcohol use: No  
 Drug use: No  
 Sexual activity: Yes Birth control/protection: Surgical  
  Comment: Hysterectomy Other Topics Concern  Not on file Social History Narrative  Not on file History reviewed. No pertinent family history. Allergies Allergen Reactions  Iodinated Contrast- Oral And Iv Dye Anaphylaxis  Iodine Anaphylaxis  Morphine Nausea and Vomiting  Nsaids (Non-Steroidal Anti-Inflammatory Drug) Other (comments) Severe edema Past Medical History:  
Diagnosis Date  Degeneration of lumbar intervertebral disc  Depression  Diabetes ([facility] [address]  Dyslipidemia  Esophageal reflux  Fibromyalgia  GERD (gastroesophageal reflux disease)  Hypercholesteremia  Hyperlipidemia  Lumbago  Migraines  Morbid obesity ([address]  Osteoarthritis  Thyroid disease  Vitamin D deficiency History reviewed. No pertinent surgical history. Current Outpatient Medications on File Prior to Visit Medication Sig  
 ezetimibe (ZETIA) 10 mg tablet Take 10 mg by mouth daily.  buprenorphine HCl (BELBUCA) 150 mcg film Take one film in the am and one film each evening (every twelve hours) for pain  synthetic conj estrogens b 0.45 mg tablet Take 0.45 mg by mouth daily.  diclofenac (VOLTAREN) 1 % gel Apply  to affected area four (4) times daily.  propranolol LA (INDERAL LA) 60 mg SR capsule Take  by mouth daily.  DULoxetine (CYMBALTA) 30 mg capsule Take 30 mg by mouth daily.  levothyroxine (SYNTHROID) 25 mcg tablet Take 25 mcg by mouth Daily (before breakfast).  ATORVASTATIN CALCIUM (LIPITOR PO) Take 10 mg by mouth daily.  diphenhydrAMINE (BENADRYL) 50 mg tablet Take 50 mg by mouth nightly as needed.  eletriptan (RELPAX) 40 mg tablet Take 40 mg by mouth once as needed. may repeat in 2 hours if necessary  promethazine (PHENERGAN) 25 mg tablet Take 25 mg by mouth every six (6) hours as needed.  albuterol (PROVENTIL HFA, VENTOLIN HFA) 90 mcg/actuation inhaler Take 2 Puffs by inhalation every four (4) hours as needed.  CINNAMON BARK (CINNAMON PO) Take 1 Cap by mouth daily.  omeprazole (PRILOSEC) 20 mg capsule Take 20 mg by mouth daily.  calcium-cholecalciferol, D3, (CALTRATE 600+D) tablet Take 1 Tab by mouth daily.  metFORMIN (GLUCOPHAGE) 500 mg tablet Take  by mouth two (2) times daily (with meals).  naloxone (NARCAN) 4 mg/actuation nasal spray 1 [place] by IntraNASal route as needed. As needed for opioid respiratory emergency only  Flaxseed Oil Oil by Does Not Apply route. No current facility-administered medications on file prior to visit.